# Patient Record
Sex: FEMALE | Race: WHITE | NOT HISPANIC OR LATINO | Employment: FULL TIME | URBAN - METROPOLITAN AREA
[De-identification: names, ages, dates, MRNs, and addresses within clinical notes are randomized per-mention and may not be internally consistent; named-entity substitution may affect disease eponyms.]

---

## 2017-02-13 ENCOUNTER — OFFICE VISIT - HEALTHEAST (OUTPATIENT)
Dept: FAMILY MEDICINE | Facility: CLINIC | Age: 42
End: 2017-02-13

## 2017-02-13 DIAGNOSIS — F33.9 MAJOR DEPRESSION, RECURRENT (H): ICD-10-CM

## 2017-02-13 DIAGNOSIS — F41.1 GENERALIZED ANXIETY DISORDER: ICD-10-CM

## 2017-02-13 DIAGNOSIS — R63.5 ABNORMAL WEIGHT GAIN: ICD-10-CM

## 2017-02-13 PROCEDURE — 99283 EMERGENCY DEPT VISIT LOW MDM: CPT

## 2017-02-14 ENCOUNTER — RECORDS - HEALTHEAST (OUTPATIENT)
Dept: ADMINISTRATIVE | Facility: OTHER | Age: 42
End: 2017-02-14

## 2017-02-14 ENCOUNTER — HOSPITAL ENCOUNTER (EMERGENCY)
Facility: CLINIC | Age: 42
Discharge: HOME | End: 2017-02-14
Attending: EMERGENCY MEDICINE | Admitting: EMERGENCY MEDICINE
Payer: COMMERCIAL

## 2017-02-14 VITALS
BODY MASS INDEX: 28.32 KG/M2 | DIASTOLIC BLOOD PRESSURE: 74 MMHG | TEMPERATURE: 97.9 F | RESPIRATION RATE: 18 BRPM | WEIGHT: 170 LBS | OXYGEN SATURATION: 95 % | HEIGHT: 65 IN | SYSTOLIC BLOOD PRESSURE: 127 MMHG

## 2017-02-14 DIAGNOSIS — H18.822 CONTACT LENS OVERWEAR, LEFT: ICD-10-CM

## 2017-02-14 DIAGNOSIS — H16.002 CORNEAL ULCERATION, LEFT: ICD-10-CM

## 2017-02-14 PROCEDURE — 25000125 ZZHC RX 250

## 2017-02-14 RX ORDER — PROPARACAINE HYDROCHLORIDE 5 MG/ML
1 SOLUTION/ DROPS OPHTHALMIC ONCE
Status: COMPLETED | OUTPATIENT
Start: 2017-02-14 | End: 2017-02-14

## 2017-02-14 RX ORDER — MOXIFLOXACIN 5 MG/ML
1 SOLUTION/ DROPS OPHTHALMIC
Qty: 1 BOTTLE | Refills: 0 | Status: SHIPPED | OUTPATIENT
Start: 2017-02-14 | End: 2017-02-21

## 2017-02-14 RX ORDER — PROPARACAINE HYDROCHLORIDE 5 MG/ML
SOLUTION/ DROPS OPHTHALMIC
Status: COMPLETED
Start: 2017-02-14 | End: 2017-02-14

## 2017-02-14 RX ADMIN — PROPARACAINE HYDROCHLORIDE 1 DROP: 5 SOLUTION/ DROPS OPHTHALMIC at 00:50

## 2017-02-14 ASSESSMENT — ENCOUNTER SYMPTOMS
EYE REDNESS: 1
EYE PAIN: 1

## 2017-02-14 NOTE — DISCHARGE INSTRUCTIONS
Corneal Ulcer    The cornea is the clear part in the front of the eye. A corneal ulcer is an open sore on the cornea.    The most common cause of a corneal ulcer is infection by bacteria, virus, or fungus. A scratch to the cornea that becomes infected can lead to a corneal ulcer. Use of contact lenses also increases the risk of a corneal ulcer from a bacterial infection. This can happen especially if contact lenses are not kept clean or are worn while sleeping. Any condition that causes dry eyes, such as decreased tear production or inability to blink normally, will increase the risk of a corneal ulcer. Chemical injury to the eye is another risk factor for a corneal ulcer.    A corneal ulcer may cause redness, pain, increased tears, and pus or mucus draining from the eye. Your vision may be blurry and the eyelid may swell.    Corneal ulcers are very serious. They may cause permanent scarring to the eye, with partial or complete blindness. Therefore, this condition must be recognized and treated very carefully. With proper treatment, corneal ulcers should improve in 2 to 3 weeks. Follow-up with an ophthalmologist (an eye MD) is very important.     Do not wear contact lenses until approved by your eye doctor.    Do not sleep in contact lenses.    Do not soak your contact lenses in anything other than sterile solution specifically made for contact lenses     Apply a cool compress to the eye (towel soaked in cool water).    Do not touch or rub your eye with your fingers.    Wash your hands often to prevent spread of the infection to the other eye.    You may use acetaminophen or ibuprofen to control pain, unless another pain medicine was prescribed. (Note: If you have chronic liver or kidney disease or have ever had a stomach ulcer, talk with your doctor before using these medicines.)    Use prescribed antibiotic eye drops or ointment exactly as directed.  Home care  Follow-up care  Follow up with your eye doctor in 1  to 2 days, or as advised.    For more information, see the American Academy of Ophthalmology web page on corneal ulcers: www.geteyesmart.org/eyesmart/diseases/corneal-ulcer.cfm.  When to seek medical advice  Call your healthcare provider right away if any of these occur:    Worsening vision    Increasing pain in the eye    Increased discharge from the eye    Increased redness of the eyes    Fever of 100.4 F (38 C) or higher, or as directed by your healthcare provider    4104-0625 [employer]. [address] [place], [place] [address]. All rights reserved. This information is not intended as a substitute for professional medical care. Always follow your healthcare professional's instructions.

## 2017-02-14 NOTE — ED AVS SNAPSHOT
Emergency Department    [address] [place] [address]    Phone:  [phone]    Fax:  [phone]                                       Marisel Jarrett   MRN: [id number]    Department:   Emergency Department   Date of Visit:  2/13/2017           After Visit Summary Signature Page     I have received my discharge instructions, and my questions have been answered. I have discussed any challenges I see with this plan with the nurse or doctor.    ..........................................................................................................................................  Patient/Patient Representative Signature      ..........................................................................................................................................  Patient Representative Print Name and Relationship to Patient    ..................................................               ................................................  Date                                            Time    ..........................................................................................................................................  Reviewed by Signature/Title    ...................................................              ..............................................  Date                                                            Time

## 2017-02-14 NOTE — ED AVS SNAPSHOT
Emergency Department    [address] [facility] [address]    Phone:  [phone]    Fax:  [phone]                                       Marisel Jarrett   MRN: [id number]    Department:   Emergency Department   Date of Visit:  2/13/2017           Patient Information     Date Of Birth          1975        Your diagnoses for this visit were:     Corneal ulceration, left     Contact lens overwear, left        You were seen by Trierweiler, Chad A, MD.      Follow-up Information     Follow up with [facility] In 3 days.    Contact information:    [address]  [address]  [facility] [address] [phone]        Follow up with  Emergency Department.    Specialty:  EMERGENCY MEDICINE    Why:  If symptoms worsen    Contact information:    [address] [facility] [address] [phone]        Discharge Instructions         Corneal Ulcer    The cornea is the clear part in the front of the eye. A corneal ulcer is an open sore on the cornea.    The most common cause of a corneal ulcer is infection by bacteria, virus, or fungus. A scratch to the cornea that becomes infected can lead to a corneal ulcer. Use of contact lenses also increases the risk of a corneal ulcer from a bacterial infection. This can happen especially if contact lenses are not kept clean or are worn while sleeping. Any condition that causes dry eyes, such as decreased tear production or inability to blink normally, will increase the risk of a corneal ulcer. Chemical injury to the eye is another risk factor for a corneal ulcer.    A corneal ulcer may cause redness, pain, increased tears, and pus or mucus draining from the eye. Your vision may be blurry and the eyelid may swell.    Corneal ulcers are very serious. They may cause permanent scarring to the eye, with partial or complete blindness. Therefore, this condition must be recognized and treated very carefully. With  proper treatment, corneal ulcers should improve in 2 to 3 weeks. Follow-up with an ophthalmologist (an eye MD) is very important.     Do not wear contact lenses until approved by your eye doctor.    Do not sleep in contact lenses.    Do not soak your contact lenses in anything other than sterile solution specifically made for contact lenses     Apply a cool compress to the eye (towel soaked in cool water).    Do not touch or rub your eye with your fingers.    Wash your hands often to prevent spread of the infection to the other eye.    You may use acetaminophen or ibuprofen to control pain, unless another pain medicine was prescribed. (Note: If you have chronic liver or kidney disease or have ever had a stomach ulcer, talk with your doctor before using these medicines.)    Use prescribed antibiotic eye drops or ointment exactly as directed.  Home care  Follow-up care  Follow up with your eye doctor in 1 to 2 days, or as advised.    For more information, see the American Academy of Ophthalmology web page on corneal ulcers: www.geteyesmart.org/eyesmart/diseases/corneal-ulcer.cfm.  When to seek medical advice  Call your healthcare provider right away if any of these occur:    Worsening vision    Increasing pain in the eye    Increased discharge from the eye    Increased redness of the eyes    Fever of 100.4 F (38 C) or higher, or as directed by your healthcare provider    0820-7437 [employer]. [address] [place] [address]. All rights reserved. This information is not intended as a substitute for professional medical care. Always follow your healthcare professional's instructions.          24 Hour Appointment Hotline       To make an appointment at [facility], call [phone] ([phone]). If you don't have a family doctor or clinic, we will help you find one. [facility] clinics are conveniently located to serve the needs of you and your family.             Review of your  medicines      START taking        Dose / Directions Last dose taken    moxifloxacin 0.5 % ophthalmic solution   Commonly known as:  VIGAMOX   Dose:  1 drop   Quantity:  1 Bottle        Apply 1 drop to eye every 4 hours (while awake) for 7 days   Refills:  0          Our records show that you are taking the medicines listed below. If these are incorrect, please call your family doctor or clinic.        Dose / Directions Last dose taken    BUPROPION HCL PO   Dose:  150 mg        Take 150 mg by mouth   Refills:  0        FLUOXETINE HCL PO   Dose:  40 mg        Take 40 mg by mouth   Refills:  0                Prescriptions were sent or printed at these locations (1 Prescription)                   Other Prescriptions                Printed at Department/Unit printer (1 of 1)         moxifloxacin (VIGAMOX) 0.5 % ophthalmic solution                Orders Needing Specimen Collection     None      Pending Results     No orders found from 2/12/2017 to 2/15/2017.            Pending Culture Results     No orders found from 2/12/2017 to 2/15/2017.             Test Results from your hospital stay            Clinical Quality Measure: Blood Pressure Screening     Your blood pressure was checked while you were in the emergency department today. The last reading we obtained was  BP: 127/74 . Please read the guidelines below about what these numbers mean and what you should do about them.  If your systolic blood pressure (the top number) is less than 120 and your diastolic blood pressure (the bottom number) is less than 80, then your blood pressure is normal. There is nothing more that you need to do about it.  If your systolic blood pressure (the top number) is 120-139 or your diastolic blood pressure (the bottom number) is 80-89, your blood pressure may be higher than it should be. You should have your blood pressure rechecked within a year by a primary care provider.  If your systolic blood pressure (the top number) is 140 or  "greater or your diastolic blood pressure (the bottom number) is 90 or greater, you may have high blood pressure. High blood pressure is treatable, but if left untreated over time it can put you at risk for heart attack, stroke, or kidney failure. You should have your blood pressure rechecked by a primary care provider within the next 4 weeks.  If your provider in the emergency department today gave you specific instructions to follow-up with your doctor or provider even sooner than that, you should follow that instruction and not wait for up to 4 weeks for your follow-up visit.        Thank you for choosing [facility]       Thank you for choosing [facility] for your care. Our goal is always to provide you with excellent care. Hearing back from our patients is one way we can continue to improve our services. Please take a few minutes to complete the written survey that you may receive in the mail after you visit with us. Thank you!        [employer] Information     [employer] lets you send messages to your doctor, view your test results, renew your prescriptions, schedule appointments and more. To sign up, go to [email or web address] . Click on \"Log in\" on the left side of the screen, which will take you to the Welcome page. Then click on \"Sign up Now\" on the right side of the page.     You will be asked to enter the access code listed below, as well as some personal information. Please follow the directions to create your username and password.     Your access code is: [email or web address]  Expires: 5/15/2017  1:02 AM     Your access code will  in 90 days. If you need help or a new code, please call your [facility] clinic or [phone].        Care EveryWhere ID     This is your Care EveryWhere ID. This could be used by other organizations to access your [facility] medical records  [id number]        After Visit Summary       This is your record. Keep this with you and show to your community pharmacist(s) and doctor(s) at your " next visit.

## 2017-02-14 NOTE — ED PROVIDER NOTES
"  History     Chief Complaint:  Eye Pain       HPI   Marisel Jarrett is a 41 year old female who presents with eye pain. The patient is a contact wearer and states that she slept with her contacts in last night. When she woke up she changed her contacts and noticed that her left eye was slightly red. This afternoon, she was organizing things in her garage and later this evening developed left eye discomfort. She states that she is unsure if she has any foreign body in the eye.     Allergies:  No known drug allergies.     Medications:    Bupropion  Fluoxetine     Past Medical History:    Anxiety   Depressive Disorder  HPV  OAB  Ovarian cyst     Past Surgical History:    Appendectomy   Oophorectomy, right     Family History:    History reviewed. No pertinent family history.     Social History:  Marital Status:   Presents to the ED alone  Tobacco Use: Never Used  Alcohol Use: No  PCP: NIKI HEATH      Review of Systems   Eyes: Positive for pain and redness.   All other systems reviewed and are negative.    Physical Exam   First Vitals:  BP: 127/74  Heart Rate: 84  Temp: 97.9  F (36.6  C)  Resp: 18  Height: 165.1 cm (5' 5\")  Weight: 77.1 kg (170 lb)  SpO2: 95 %    Physical Exam  Eye:  Pupils are equal, round, and reactive.  Extraocular movements intact. Left eye is red and inflamed with possible photophobia. Slit lamp exam shows no foreign body or anterior chamber cell or flare. Fluorescence shows uptake in an ulcer at 9 o clock no dendrites noted.     ENT:  No rhinorrhea.  Moist mucus membranes.  Normal tongue and tonsil.    Musculoskeletal:  Normal movement of all extremities without evidence for deficit.    Skin:  Warm and dry without rashes.    Neurologic:  Non-focal exam without asymmetric weakness or numbness.     Psychiatric:  Normal affect with appropriate interaction with examiner.     Emergency Department Course   Interventions:   Proparacaine, 1 drop, left eye    Emergency Department " Course:  Nursing notes and vitals reviewed.  I performed an exam of the patient as documented above.   Findings and plan explained to the patient. Patient discharged home with instructions regarding supportive care, medications, and reasons to return. The importance of close follow-up was reviewed. The patient was prescribed Vigamox.      Impression & Plan    Medical Decision Making:  This 41 year old contact lens wearer presents with complaints of having left sided eye pain that started after sleeping with her contacts in last night. She describes progressive soreness throughout the day, much worse tonight. On exam, she does have photophobia and redness throughout the eye. However this was completely resolved with one dose of proparacaine. Slit lamp exam shows clear evidence of a corneal ulceration at 9 o'clock without evidence of dendrites or other signs of herpes infection. She will be treated with Vigamox and advised to keep her contact lenses out for the next several days. She will follow up with the local ophthalmologist if she is not having complete resolution of her symptoms in the next 48 hours with immediate return to us for worsening of condition or other emergent concerns.     Diagnosis:    ICD-10-CM    1. Corneal ulceration, left H16.002    2. Contact lens overwear, left H18.822        Disposition:  discharged to home    Discharge Medications:  Discharge Medication List as of 2/14/2017  1:02 AM      START taking these medications    Details   moxifloxacin (VIGAMOX) 0.5 % ophthalmic solution Apply 1 drop to eye every 4 hours (while awake) for 7 days, Disp-1 Bottle, R-0, Local Print               Cindy BRYANT am serving as a scribe on 2/14/2017 at 12:45 AM to personally document services performed by Dr. Trierweiler based on my observations and the provider's statements to me.    2/13/2017    EMERGENCY DEPARTMENT       Trierweiler, Chad A, MD  02/14/17 [id number]

## 2017-03-16 ENCOUNTER — OFFICE VISIT - HEALTHEAST (OUTPATIENT)
Dept: FAMILY MEDICINE | Facility: CLINIC | Age: 42
End: 2017-03-16

## 2017-03-16 DIAGNOSIS — L71.0 PERIORAL DERMATITIS: ICD-10-CM

## 2017-03-16 DIAGNOSIS — F33.9 MAJOR DEPRESSION, RECURRENT (H): ICD-10-CM

## 2017-03-16 DIAGNOSIS — Z80.8 FAMILY HISTORY OF MELANOMA: ICD-10-CM

## 2017-03-16 DIAGNOSIS — F41.1 GENERALIZED ANXIETY DISORDER: ICD-10-CM

## 2017-08-26 ENCOUNTER — COMMUNICATION - HEALTHEAST (OUTPATIENT)
Dept: FAMILY MEDICINE | Facility: CLINIC | Age: 42
End: 2017-08-26

## 2017-08-26 DIAGNOSIS — F41.1 GENERALIZED ANXIETY DISORDER: ICD-10-CM

## 2017-08-26 DIAGNOSIS — F33.9 MAJOR DEPRESSION, RECURRENT (H): ICD-10-CM

## 2017-10-06 ENCOUNTER — OFFICE VISIT - HEALTHEAST (OUTPATIENT)
Dept: FAMILY MEDICINE | Facility: CLINIC | Age: 42
End: 2017-10-06

## 2017-10-06 DIAGNOSIS — E78.5 HYPERLIPIDEMIA: ICD-10-CM

## 2017-10-06 DIAGNOSIS — Z00.00 ENCOUNTER FOR ROUTINE HISTORY AND PHYSICAL EXAM IN FEMALE PATIENT: ICD-10-CM

## 2017-10-06 DIAGNOSIS — Z12.4 PAP SMEAR FOR CERVICAL CANCER SCREENING: ICD-10-CM

## 2017-10-06 LAB
CHOLEST SERPL-MCNC: 243 MG/DL
FASTING STATUS PATIENT QL REPORTED: NO
HDLC SERPL-MCNC: 57 MG/DL
LDLC SERPL CALC-MCNC: 150 MG/DL
TRIGL SERPL-MCNC: 179 MG/DL

## 2017-10-06 ASSESSMENT — MIFFLIN-ST. JEOR: SCORE: 1424

## 2017-10-12 LAB
HPV INTERPRETATION - HISTORICAL: ABNORMAL
HPV INTERPRETER - HISTORICAL: ABNORMAL

## 2017-10-16 LAB
BKR LAB AP ABNORMAL BLEEDING: NO
BKR LAB AP BIRTH CONTROL/HORMONES: ABNORMAL
BKR LAB AP CERVICAL APPEARANCE: NORMAL
BKR LAB AP GYN ADEQUACY: ABNORMAL
BKR LAB AP GYN INTERPRETATION: ABNORMAL
BKR LAB AP HPV REFLEX: ABNORMAL
BKR LAB AP LMP: ABNORMAL
BKR LAB AP PATIENT STATUS: ABNORMAL
BKR LAB AP PREVIOUS ABNORMAL: ABNORMAL
BKR LAB AP PREVIOUS NORMAL: ABNORMAL
HIGH RISK?: NO
PATH REPORT.COMMENTS IMP SPEC: ABNORMAL
RESULT FLAG (HE HISTORICAL CONVERSION): ABNORMAL

## 2018-01-03 ENCOUNTER — COMMUNICATION - HEALTHEAST (OUTPATIENT)
Dept: FAMILY MEDICINE | Facility: CLINIC | Age: 43
End: 2018-01-03

## 2018-01-03 DIAGNOSIS — F41.1 GENERALIZED ANXIETY DISORDER: ICD-10-CM

## 2018-01-03 DIAGNOSIS — F33.9 MAJOR DEPRESSION, RECURRENT (H): ICD-10-CM

## 2018-04-24 ENCOUNTER — COMMUNICATION - HEALTHEAST (OUTPATIENT)
Dept: FAMILY MEDICINE | Facility: CLINIC | Age: 43
End: 2018-04-24

## 2018-04-24 DIAGNOSIS — F41.1 GENERALIZED ANXIETY DISORDER: ICD-10-CM

## 2018-04-24 DIAGNOSIS — F33.9 MAJOR DEPRESSION, RECURRENT (H): ICD-10-CM

## 2018-05-23 ENCOUNTER — COMMUNICATION - HEALTHEAST (OUTPATIENT)
Dept: FAMILY MEDICINE | Facility: CLINIC | Age: 43
End: 2018-05-23

## 2018-05-23 DIAGNOSIS — F33.9 MAJOR DEPRESSION, RECURRENT (H): ICD-10-CM

## 2018-05-23 DIAGNOSIS — F41.1 GENERALIZED ANXIETY DISORDER: ICD-10-CM

## 2018-08-19 ENCOUNTER — COMMUNICATION - HEALTHEAST (OUTPATIENT)
Dept: FAMILY MEDICINE | Facility: CLINIC | Age: 43
End: 2018-08-19

## 2018-08-19 DIAGNOSIS — F33.9 MAJOR DEPRESSION, RECURRENT (H): ICD-10-CM

## 2018-08-19 DIAGNOSIS — F41.1 GENERALIZED ANXIETY DISORDER: ICD-10-CM

## 2018-09-06 ENCOUNTER — RECORDS - HEALTHEAST (OUTPATIENT)
Dept: ADMINISTRATIVE | Facility: OTHER | Age: 43
End: 2018-09-06

## 2018-10-16 ENCOUNTER — RECORDS - HEALTHEAST (OUTPATIENT)
Dept: ADMINISTRATIVE | Facility: OTHER | Age: 43
End: 2018-10-16

## 2018-12-01 ENCOUNTER — COMMUNICATION - HEALTHEAST (OUTPATIENT)
Dept: FAMILY MEDICINE | Facility: CLINIC | Age: 43
End: 2018-12-01

## 2018-12-01 DIAGNOSIS — F33.9 MAJOR DEPRESSION, RECURRENT (H): ICD-10-CM

## 2018-12-01 DIAGNOSIS — F41.1 GENERALIZED ANXIETY DISORDER: ICD-10-CM

## 2018-12-02 ENCOUNTER — COMMUNICATION - HEALTHEAST (OUTPATIENT)
Dept: FAMILY MEDICINE | Facility: CLINIC | Age: 43
End: 2018-12-02

## 2018-12-02 DIAGNOSIS — F33.9 MAJOR DEPRESSION, RECURRENT (H): ICD-10-CM

## 2018-12-02 DIAGNOSIS — F41.1 GENERALIZED ANXIETY DISORDER: ICD-10-CM

## 2019-02-27 ENCOUNTER — OFFICE VISIT - HEALTHEAST (OUTPATIENT)
Dept: FAMILY MEDICINE | Facility: CLINIC | Age: 44
End: 2019-02-27

## 2019-02-27 DIAGNOSIS — F33.9 MAJOR DEPRESSION, RECURRENT (H): ICD-10-CM

## 2019-02-27 DIAGNOSIS — E78.5 DYSLIPIDEMIA: ICD-10-CM

## 2019-02-27 DIAGNOSIS — Z00.00 ROUTINE GENERAL MEDICAL EXAMINATION AT A HEALTH CARE FACILITY: ICD-10-CM

## 2019-02-27 DIAGNOSIS — F41.1 GENERALIZED ANXIETY DISORDER: ICD-10-CM

## 2019-02-27 ASSESSMENT — MIFFLIN-ST. JEOR: SCORE: 1425.14

## 2019-04-01 ENCOUNTER — COMMUNICATION - HEALTHEAST (OUTPATIENT)
Dept: FAMILY MEDICINE | Facility: CLINIC | Age: 44
End: 2019-04-01

## 2019-10-28 ENCOUNTER — PRE VISIT (OUTPATIENT)
Dept: NEUROLOGY | Facility: CLINIC | Age: 44
End: 2019-10-28

## 2019-10-28 NOTE — TELEPHONE ENCOUNTER
FUTURE VISIT INFORMATION      FUTURE VISIT INFORMATION:    Date: 12/9/2019    Time: 10AM    Location: [facility]  REFERRAL INFORMATION:    Referring provider:  Self     Referring providers clinic:      Reason for visit/diagnosis  Headaches     RECORDS REQUESTED FROM:       Clinic name Comments Records Status Imaging Status   [facility]  Care [facility]where N/A    [facility]

## 2019-10-31 ENCOUNTER — AMBULATORY - HEALTHEAST (OUTPATIENT)
Dept: FAMILY MEDICINE | Facility: CLINIC | Age: 44
End: 2019-10-31

## 2019-12-09 ENCOUNTER — RECORDS - HEALTHEAST (OUTPATIENT)
Dept: ADMINISTRATIVE | Facility: OTHER | Age: 44
End: 2019-12-09

## 2019-12-09 ENCOUNTER — OFFICE VISIT (OUTPATIENT)
Dept: NEUROLOGY | Facility: CLINIC | Age: 44
End: 2019-12-09
Payer: COMMERCIAL

## 2019-12-09 VITALS
HEART RATE: 92 BPM | BODY MASS INDEX: 27.46 KG/M2 | DIASTOLIC BLOOD PRESSURE: 78 MMHG | SYSTOLIC BLOOD PRESSURE: 113 MMHG | OXYGEN SATURATION: 94 % | WEIGHT: 165 LBS

## 2019-12-09 DIAGNOSIS — G43.101 MIGRAINE WITH AURA AND WITH STATUS MIGRAINOSUS, NOT INTRACTABLE: Primary | ICD-10-CM

## 2019-12-09 RX ORDER — SUMATRIPTAN 50 MG/1
50-100 TABLET, FILM COATED ORAL
Qty: 12 TABLET | Refills: 6 | Status: SHIPPED | OUTPATIENT
Start: 2019-12-09 | End: 2020-06-12

## 2019-12-09 RX ORDER — ONDANSETRON 4 MG/1
4 TABLET, FILM COATED ORAL EVERY 6 HOURS PRN
Qty: 20 TABLET | Refills: 3 | Status: SHIPPED | OUTPATIENT
Start: 2019-12-09 | End: 2022-05-23

## 2019-12-09 RX ORDER — PROCHLORPERAZINE MALEATE 5 MG
2.5-5 TABLET ORAL EVERY 6 HOURS PRN
Qty: 20 TABLET | Refills: 3 | Status: SHIPPED | OUTPATIENT
Start: 2019-12-09 | End: 2022-05-23

## 2019-12-09 ASSESSMENT — PAIN SCALES - GENERAL: PAINLEVEL: NO PAIN (0)

## 2019-12-09 NOTE — NURSING NOTE
Chief Complaint   Patient presents with     Headache     [facility] NEW HEADACHE - CHRONIC HEADACHE     Shakira Sierra, EMT

## 2019-12-09 NOTE — LETTER
12/9/2019       RE: Marisel Jarrett  [address] [place] [address]     Dear Colleague,    Thank you for referring your patient, Marisel Jarrett, to the [facility]UROLOGY at [facility]. Please see a copy of my visit note below.    Re: Marisel Jarrett  MRN# [id number]  YOB: 1975  Date of Visit:12/9/2019    OUTPATIENT NEUROLOGY VISIT NOTE    Chief Complaint:  Headache evaluation    History of Present Illness  Marisel Jarrett is a 44-year-old female presents to the clinic today for headache evaluation.     Headache History:    Onset History: in late 30s and family history of headaches- sister     Current Headache Pattern:      Frequency (How many headache days per month?):about 1-2 times per month for several years     Duration of Headache: usually 24 hours     Aura: visual aura and lasting 1-2 hours and 1-2 times per month   Associated Symptoms:  nausea, vomiting, light sensitivity >sound sensitivity and prodrome feels like a hang overs       Description of Headache Pain & Location: pain feels as throbbing and starts in the back of her head and right neck and moves up to the right supraorbital/frontal area, 8-9/10 on the numeric pain scale    Do headaches interfere with or prevent usual activities or diminish your productivity at home or work?  Yes - affects family life      Treatments Tried:    Tylenol pm or advil  ondansetron tried for other reasons     Have you needed to utilize the Emergency Room to treat your headache symptoms? no    Are Headaches worsening over time?  Reports that they do because of vomiting    What makes your headaches better?  Ice pack and sleeping     What makes your headaches worse or triggers your headaches? Driving/traveling a lot and     IVF with a donating egg and in between cycles on estrogen replacement   No alcohol   No smoking     Denies history of head or neck trauma, dizziness, vertigo,  loss of consciousness, seizure, double vision, blurred vision, hearing difficulty, speech or swallowing difficulty, weakness or numbness in face, arms or legs, urinary or bowel incontinence, coordination problems or gait difficulty, fever or chills.    Neurodiagnostic Testing  CT head none available  MRI brain none available    Past Medical History reviewed and verified with the patient    Past Surgical History reviewed and verified with the patient  Past Surgical History:   Procedure Laterality Date     AK APPENDECTOMY   Description: Appendectomy; Recorded: 11/10/2012; Comments: Sept 2012     AK REMOVAL OF OVARIAN CYST(S)   Description: Ovarian Cystectomy; Recorded: 08/15/2013; Comments: Right, 2013 - benign     UTERINE FIBROID SURGERY 08/2016   at [facility]     WISDOM TeeTH EXTRACTION   at the age [age]  PE tubes at the age of 3    Family History reviewed and verified with the patient  ADHD and on bupropion and fluoxetine  History of anxiety and depression  Social History:   last summer and has 3 step kids, but no biological kids, works in Sales as a rep OR urology and has a MS in theater  Social History     Tobacco Use     Smoking status: Never Smoker     Smokeless tobacco: Never Used   Substance Use Topics     Alcohol use: No    reviewed and verified with the patient   No Known Allergies    Current Outpatient Medications   Medication Sig Dispense Refill     BUPROPION HCL PO Take 150 mg by mouth       FLUOXETINE HCL PO Take 40 mg by mouth     reviewed and verified with the patient    Review of Systems:  A 12-point ROS including constitutional, eyes, ENT, respiratory, cardiovascular, gastroenterology, genitourinary, integumentary, musculoskeletal, neurology, hematology and psychiatric were all reviewed with the patient and completed at the Neuroscience Services Question nary and as mentioned in the HPI.     General Exam:   /78   Pulse 92   Wt 74.8 kg (165 lb)   SpO2 94%   BMI 27.46 kg/m     GEN: Awake,  NAD; good eye contact, responses appropriately, healthy appearing   HEENT: Head atraumatic/Normocephalic. Scalp normal. Pupils equally round, 4 mm, reactive to light and accommodation, sclera and conjunctiva normal. Fundoscopic examination reveals normal vessels no papilledema.   Neck: Easily moveable without resistance  Heart: S1/S2 appreciated, RRR, no m/r/g, no carotid bruits  Lungs:Lungs are clear to auscultation bilaterally, no wheezes or crackles.   Neurological Examination:  The patient is alert and oriented times four. Has good attention and concentration. Speech is fluent without dysarthria.   Cranial nerves:  CN I deferred.   CN II: Intact and full visual fields to confrontation bilaterally. Funduscopic exam revealed disc bilaterally.   CN III, IV, VI: EOM intact. There is no nystagmus. Has conjugated gaze. Intact direct and consensual pupillary light reflexes.   CN V: Intact and symmetrical to facial sensation in the V1 through V3 bilaterally.   CN VII: Intact and symmetrical eyebrow and lid raise and eyelid closure, smiles and frown.   CN VIII: Intact to finger rub bilaterally.   CN IX and X: The palates elevates symmetrical. The uvula is midline.   CN XII: The tongue protrudes midline with no atrophy or fasciculations.   Motor exam: The patient has a normal bulk and tone throughout. There is no atrophy, fasciculations, clonus, or abnormal movements appreciated.   Strength Exam:  5/5 strength at shoulder abduction, elbow flexion or extension, wrist flexion or extension, finger abduction, , hip flexion and extension, knee flexion and extension, and dorsiflexion and plantarflexion bilaterally.   Sensation is intact to light touch and pinprick throughout.  Reflexes are 2+ and symmetrical at biceps, triceps, brachioradialis, patellar, and Achilles.   Coordination reveals finger-nose-finger with normal speed and accuracy.   Station and gait is normal. There is no ataxia.     Assessment and Plan:  Migraine  with aura with reported frequency 1-2 times per month for several years. Family history of migraine headaches. Non focal neurological exam today. Discussed headaches etiology and treatment options.   Plan:  Headache log for frequency and severity   Acute migraine treatment -  Stay hydrated  A trial of sumatriptan  mg at migraine headache onset and may repeat in 2 hours as needed. Max 200 mg in 24 hours. Limit use to no more than 8 days per month.May take with naproxen 1-2 tablets every 12 hours as needed and if tolerated.   Ondansetron 4 mg orally every 6 hours as needed. May try prochlorperazine as needed for nausea but it can make you drowsy or anxious.   Migraine prevention-riboflavin (B2) 400 mg daily OTC   Discuss with your OB/GYN use of sumatriptan and other medications for migraine headache if pregnant  Follow up in 3 months or sooner     Prescription for sumatriptan, ondansetron, prochlorperazine provided. Correct use and course provided. Expected benefits and typical side effects reviewed. Safety of concomitant medications and interactions reviewed. Patient taught signs and symptoms of adverse reactions and allergies. Patient understands teaching and accepts risks of prescribed medication regimen.    I discussed all my recommendation with Marisel Jarrett. The patient verbalizes understanding and comfortable with the plan. The patient has our clinic phone number to call with any questions or concerns. All of the patient's questions were answered from the best of my current knowledge.     Thank you for letting me be a part of the treatment team for Marisel Jarrett    Time spent with pt answering questions, discussing findings, counseling and coordinating care was more than 50% the appointment time, 56 minutes.     MATTI Hudson, [facility]

## 2019-12-09 NOTE — PATIENT INSTRUCTIONS
Plan:  Headache log for frequency and severity   Acute migraine treatment -  Stay hydrated  A trial of sumatriptan  mg at migraine headache onset and may repeat in 2 hours as needed. Max 200 mg in 24 hours. Limit use to no more than 8 days per month.May take with naproxen 1-2 tablets every 12 hours as needed and if tolerated.   Ondansetron 4 mg orally every 6 hours as needed. May try prochlorperazine as needed for nausea but it can make you drowsy or anxious.   Migraine prevention-riboflavin (B2) 400 mg daily OTC   Discuss with your OB/GYN use of sumatriptan and other medications for migraine headache if pregnant  Follow up in 3 months or sooner       Patient Education     Sumatriptan tablets  Brand Names: Imitrex, Migraine Pack  What is this medicine?  SUMATRIPTAN (alexandr ma TRIP tan) is used to treat migraines with or without aura. An aura is a strange feeling or visual disturbance that warns you of an attack. It is not used to prevent migraines.  How should I use this medicine?  Take this medicine by mouth with a glass of water. Follow the directions on the prescription label. This medicine is taken at the first symptoms of a migraine. It is not for everyday use. If your migraine headache returns after one dose, you can take another dose as directed. You must leave at least 2 hours between doses, and do not take more than 100 mg as a single dose. Do not take more than 200 mg total in any 24 hour period. If there is no improvement at all after the first dose, do not take a second dose without talking to your doctor or health care professional. Do not take your medicine more often than directed.  Talk to your pediatrician regarding the use of this medicine in children. Special care may be needed.  What side effects may I notice from receiving this medicine?  Side effects that you should report to your doctor or health care professional as soon as possible:    allergic reactions like skin rash, itching or hives,  swelling of the face, lips, or tongue    bloody or watery diarrhea    hallucination, loss of contact with reality    pain, tingling, numbness in the face, hands, or feet    seizures    signs and symptoms of a blood clot such as breathing problems; changes in vision; chest pain; severe, sudden headache; pain, swelling, warmth in the leg; trouble speaking; sudden numbness or weakness of the face, arm, or leg    signs and symptoms of a dangerous change in heartbeat or heart rhythm like chest pain; dizziness; fast or irregular heartbeat; palpitations, feeling faint or lightheaded; falls; breathing problems    signs and symptoms of a stroke like changes in vision; confusion; trouble speaking or understanding; severe headaches; sudden numbness or weakness of the face, arm, or leg; trouble walking; dizziness; loss of balance or coordination    stomach pain  Side effects that usually do not require medical attention (report to your doctor or health care professional if they continue or are bothersome):    changes in taste    facial flushing    headache    muscle cramps    muscle pain    nausea, vomiting    weak or tired  What may interact with this medicine?  Do not take this medicine with any of the following medicines:    cocaine    ergot alkaloids like dihydroergotamine, ergonovine, ergotamine, methylergonovine    feverfew    MAOIs like Carbex, Eldepryl, Marplan, Nardil, and Parnate    other medicines for migraine headache like almotriptan, eletriptan, frovatriptan, naratriptan, rizatriptan, zolmitriptan    tryptophan  This medicine may also interact with the following medications:    certain medicines for depression, anxiety, or psychotic disturbances  What if I miss a dose?  This does not apply; this medicine is not for regular use.  Where should I keep my medicine?  Keep out of the reach of children.  Store at room temperature between 2 and 30 degrees C (36 and 86 degrees F). Throw away any unused medicine after the  expiration date.  What should I tell my health care provider before I take this medicine?  They need to know if you have any of these conditions:    circulation problems in fingers and toes    diabetes    heart disease    high blood pressure    high cholesterol    history of irregular heartbeat    history of stroke    kidney disease    liver disease    postmenopausal or surgical removal of uterus and ovaries    seizures    smoke tobacco    stomach or intestine problems    an unusual or allergic reaction to sumatriptan, other medicines, foods, dyes, or preservatives    pregnant or trying to get pregnant    breast-feeding  What should I watch for while using this medicine?  Only take this medicine for a migraine headache. Take it if you get warning symptoms or at the start of a migraine attack. It is not for regular use to prevent migraine attacks.  You may get drowsy or dizzy. Do not drive, use machinery, or do anything that needs mental alertness until you know how this medicine affects you. To reduce dizzy or fainting spells, do not sit or stand up quickly, especially if you are an older patient. Alcohol can increase drowsiness, dizziness and flushing. Avoid alcoholic drinks.  Smoking cigarettes may increase the risk of heart-related side effects from using this medicine.  If you take migraine medicines for 10 or more days a month, your migraines may get worse. Keep a diary of headache days and medicine use. Contact your healthcare professional if your migraine attacks occur more frequently.  NOTE:This sheet is a summary. It may not cover all possible information. If you have questions about this medicine, talk to your doctor, pharmacist, or health care provider. Copyright  2019 [employer]           Patient Education     Ondansetron tablets  Brand Name: Zofran  What is this medicine?  ONDANSETRON (on DAN se dione) is used to treat nausea and vomiting caused by chemotherapy. It is also used to prevent or treat nausea  and vomiting after surgery.  How should I use this medicine?  Take this medicine by mouth with a glass of water. Follow the directions on your prescription label. Take your doses at regular intervals. Do not take your medicine more often than directed.  Talk to your pediatrician regarding the use of this medicine in children. Special care may be needed.  What side effects may I notice from receiving this medicine?  Side effects that you should report to your doctor or health care professional as soon as possible:    allergic reactions like skin rash, itching or hives, swelling of the face, lips or tongue    breathing problems    confusion    dizziness    fast or irregular heartbeat    feeling faint or lightheaded, falls    fever and chills    loss of balance or coordination    seizures    sweating    swelling of the hands or feet    tightness in the chest    tremors    unusually weak or tired  Side effects that usually do not require medical attention (report to your doctor or health care professional if they continue or are bothersome):    constipation or diarrhea    headache  What may interact with this medicine?  Do not take this medicine with any of the following medications:    apomorphine    certain medicines for fungal infections like fluconazole, itraconazole, ketoconazole, posaconazole, voriconazole    cisapride    dofetilide    dronedarone    pimozide    thioridazine    ziprasidone  This medicine may also interact with the following medications:    carbamazepine    certain medicines for depression, anxiety, or psychotic disturbances    fentanyl    linezolid    MAOIs like Carbex, Eldepryl, Marplan, Nardil, and Parnate    methylene blue (injected into a vein)    other medicines that prolong the QT interval (cause an abnormal heart rhythm)    phenytoin    rifampicin    tramadol  What if I miss a dose?  If you miss a dose, take it as soon as you can. If it is almost time for your next dose, take only that  dose. Do not take double or extra doses.  Where should I keep my medicine?  Keep out of the reach of children.  Store between 2 and 30 degrees C (36 and 86 degrees F). Throw away any unused medicine after the expiration date.  What should I tell my health care provider before I take this medicine?  They need to know if you have any of these conditions:    heart disease    history of irregular heartbeat    liver disease    low levels of magnesium or potassium in the blood    an unusual or allergic reaction to ondansetron, granisetron, other medicines, foods, dyes, or preservatives    pregnant or trying to get pregnant    breast-feeding  What should I watch for while using this medicine?  Check with your doctor or health care professional right away if you have any sign of an allergic reaction.  NOTE:This sheet is a summary. It may not cover all possible information. If you have questions about this medicine, talk to your doctor, pharmacist, or health care provider. Copyright  2019 [employer]           Patient Education     Prochlorperazine tablets  Brand Name: Compazine  What is this medicine?  PROCHLORPERAZINE (proe klor PER amaris khalil) helps to control severe nausea and vomiting. This medicine is also used to treat schizophrenia. It can also help patients who experience anxiety that is not due to psychological illness.  How should I use this medicine?  Take this medicine by mouth with a glass of water. Follow the directions on the prescription label. Take your doses at regular intervals. Do not take your medicine more often than directed. Do not stop taking this medicine suddenly. This can cause nausea, vomiting, and dizziness. Ask your doctor or health care professional for advice.  Talk to your pediatrician regarding the use of this medicine in children. Special care may be needed. While this drug may be prescribed for children as young as 2 years for selected conditions, precautions do apply.  What side effects may I  notice from receiving this medicine?  Side effects that you should report to your doctor or health care professional as soon as possible:    blurred vision    breast enlargement in men or women    breast milk in women who are not breast-feeding    chest pain, fast or irregular heartbeat    confusion, restlessness    dark yellow or brown urine    difficulty breathing or swallowing    dizziness or fainting spells    drooling, shaking, movement difficulty (shuffling walk) or rigidity    fever, chills, sore throat    involuntary or uncontrollable movements of the eyes, mouth, head, arms, and legs    seizures    stomach area pain    unusually weak or tired    unusual bleeding or bruising    yellowing of skin or eyes  Side effects that usually do not require medical attention (report to your doctor or health care professional if they continue or are bothersome):    difficulty passing urine    difficulty sleeping    headache    sexual dysfunction    skin rash, or itching  What may interact with this medicine?  Do not take this medicine with any of the following medications:    amoxapine    antidepressants like citalopram, escitalopram, fluoxetine, paroxetine, and sertraline    deferoxamine    dofetilide    maprotiline    tricyclic antidepressants like amitriptyline, clomipramine, imipramine, nortiptyline and others  This medicine may also interact with the following medications:    lithium    medicines for pain    phenytoin    propranolol    warfarin  What if I miss a dose?  If you miss a dose, take it as soon as you can. If it is almost time for your next dose, take only that dose. Do not take double or extra doses.  Where should I keep my medicine?  Keep out of the reach of children.  Store at room temperature between 15 and 30 degrees C (59 and 86 degrees F). Protect from light. Throw away any unused medicine after the expiration date.  What should I tell my health care provider before I take this medicine?  They need  to know if you have any of these conditions:    blood disorders or disease    dementia    liver disease or jaundice    Parkinson's disease    uncontrollable movement disorder    an unusual or allergic reaction to prochlorperazine, other medicines, foods, dyes, or preservatives    pregnant or trying to get pregnant    breast-feeding  What should I watch for while using this medicine?  Visit your doctor or health care professional for regular checks on your progress.  You may get drowsy or dizzy. Do not drive, use machinery, or do anything that needs mental alertness until you know how this medicine affects you. Do not stand or sit up quickly, especially if you are an older patient. This reduces the risk of dizzy or fainting spells. Alcohol may interfere with the effect of this medicine. Avoid alcoholic drinks.  This medicine can reduce the response of your body to heat or cold. Dress warm in cold weather and stay hydrated in hot weather. If possible, avoid extreme temperatures like saunas, hot tubs, very hot or cold showers, or activities that can cause dehydration such as vigorous exercise.  This medicine can make you more sensitive to the sun. Keep out of the sun. If you cannot avoid being in the sun, wear protective clothing and use sunscreen. Do not use sun lamps or tanning beds/booths.  Your mouth may get dry. Chewing sugarless gum or sucking hard candy, and drinking plenty of water may help. Contact your doctor if the problem does not go away or is severe.  NOTE:This sheet is a summary. It may not cover all possible information. If you have questions about this medicine, talk to your doctor, pharmacist, or health care provider. Copyright  2019 [employer]

## 2019-12-09 NOTE — PROGRESS NOTES
Re: Marisel Jarrett  MRN# [id number]  YOB: 1975  Date of Visit:12/9/2019    OUTPATIENT NEUROLOGY VISIT NOTE    Chief Complaint:  Headache evaluation    History of Present Illness  Marisel Jarrett is a 44-year-old female presents to the clinic today for headache evaluation.     Headache History:    Onset History: in late 30s and family history of headaches- sister     Current Headache Pattern:      Frequency (How many headache days per month?):about 1-2 times per month for several years     Duration of Headache: usually 24 hours     Aura: visual aura and lasting 1-2 hours and 1-2 times per month   Associated Symptoms:  nausea, vomiting, light sensitivity >sound sensitivity and prodrome feels like a hang overs       Description of Headache Pain & Location: pain feels as throbbing and starts in the back of her head and right neck and moves up to the right supraorbital/frontal area, 8-9/10 on the numeric pain scale    Do headaches interfere with or prevent usual activities or diminish your productivity at home or work?  Yes - affects family life      Treatments Tried:    Tylenol pm or advil  ondansetron tried for other reasons     Have you needed to utilize the Emergency Room to treat your headache symptoms? no    Are Headaches worsening over time?  Reports that they do because of vomiting    What makes your headaches better?  Ice pack and sleeping     What makes your headaches worse or triggers your headaches? Driving/traveling a lot and     IVF with a donating egg and in between cycles on estrogen replacement   No alcohol   No smoking     Denies history of head or neck trauma, dizziness, vertigo, loss of consciousness, seizure, double vision, blurred vision, hearing difficulty, speech or swallowing difficulty, weakness or numbness in face, arms or legs, urinary or bowel incontinence, coordination problems or gait difficulty, fever or chills.    Neurodiagnostic Testing  CT head none  available  MRI brain none available    Past Medical History reviewed and verified with the patient    Past Surgical History reviewed and verified with the patient  Past Surgical History:   Procedure Laterality Date     LA APPENDECTOMY   Description: Appendectomy; Recorded: 11/10/2012; Comments: Sept 2012     LA REMOVAL OF OVARIAN CYST(S)   Description: Ovarian Cystectomy; Recorded: 08/15/2013; Comments: Right, 2013 - benign     UTERINE FIBROID SURGERY 08/2016   at [facility]     WISDOM TeeTH EXTRACTION  at the age [age]  PE tubes at the age of 3    Family History reviewed and verified with the patient  ADHD and on bupropion and fluoxetine  History of anxiety and depression  Social History:   last summer and has 3 step kids, but no biological kids, works in Sales as a rep OR urology and has a MS in theater  Social History     Tobacco Use     Smoking status: Never Smoker     Smokeless tobacco: Never Used   Substance Use Topics     Alcohol use: No    reviewed and verified with the patient   No Known Allergies    Current Outpatient Medications   Medication Sig Dispense Refill     BUPROPION HCL PO Take 150 mg by mouth       FLUOXETINE HCL PO Take 40 mg by mouth     reviewed and verified with the patient    Review of Systems:  A 12-point ROS including constitutional, eyes, ENT, respiratory, cardiovascular, gastroenterology, genitourinary, integumentary, musculoskeletal, neurology, hematology and psychiatric were all reviewed with the patient and completed at the [facility] and as mentioned in the HPI.     General Exam:   /78   Pulse 92   Wt 74.8 kg (165 lb)   SpO2 94%   BMI 27.46 kg/m    GEN: Awake, NAD; good eye contact, responses appropriately, healthy appearing   HEENT: Head atraumatic/Normocephalic. Scalp normal. Pupils equally round, 4 mm, reactive to light and accommodation, sclera and conjunctiva normal. Fundoscopic examination reveals normal vessels no papilledema.   Neck: Easily  moveable without resistance  Heart: S1/S2 appreciated, RRR, no m/r/g, no carotid bruits  Lungs:Lungs are clear to auscultation bilaterally, no wheezes or crackles.   Neurological Examination:  The patient is alert and oriented times four. Has good attention and concentration. Speech is fluent without dysarthria.   Cranial nerves:  CN I deferred.   CN II: Intact and full visual fields to confrontation bilaterally. Funduscopic exam revealed disc bilaterally.   CN III, IV, VI: EOM intact. There is no nystagmus. Has conjugated gaze. Intact direct and consensual pupillary light reflexes.   CN V: Intact and symmetrical to facial sensation in the V1 through V3 bilaterally.   CN VII: Intact and symmetrical eyebrow and lid raise and eyelid closure, smiles and frown.   CN VIII: Intact to finger rub bilaterally.   CN IX and X: The palates elevates symmetrical. The uvula is midline.   CN XII: The tongue protrudes midline with no atrophy or fasciculations.   Motor exam: The patient has a normal bulk and tone throughout. There is no atrophy, fasciculations, clonus, or abnormal movements appreciated.   Strength Exam:  5/5 strength at shoulder abduction, elbow flexion or extension, wrist flexion or extension, finger abduction, , hip flexion and extension, knee flexion and extension, and dorsiflexion and plantarflexion bilaterally.   Sensation is intact to light touch and pinprick throughout.  Reflexes are 2+ and symmetrical at biceps, triceps, brachioradialis, patellar, and Achilles.   Coordination reveals finger-nose-finger with normal speed and accuracy.   Station and gait is normal. There is no ataxia.     Assessment and Plan:  Migraine with aura with reported frequency 1-2 times per month for several years. Family history of migraine headaches. Non focal neurological exam today. Discussed headaches etiology and treatment options.   Plan:  Headache log for frequency and severity   Acute migraine treatment -  Stay hydrated  A  trial of sumatriptan  mg at migraine headache onset and may repeat in 2 hours as needed. Max 200 mg in 24 hours. Limit use to no more than 8 days per month.May take with naproxen 1-2 tablets every 12 hours as needed and if tolerated.   Ondansetron 4 mg orally every 6 hours as needed. May try prochlorperazine as needed for nausea but it can make you drowsy or anxious.   Migraine prevention-riboflavin (B2) 400 mg daily OTC   Discuss with your OB/GYN use of sumatriptan and other medications for migraine headache if pregnant  Follow up in 3 months or sooner       Prescription for sumatriptan, ondansetron, prochlorperazine provided. Correct use and course provided. Expected benefits and typical side effects reviewed. Safety of concomitant medications and interactions reviewed. Patient taught signs and symptoms of adverse reactions and allergies. Patient understands teaching and accepts risks of prescribed medication regimen.        I discussed all my recommendation with Marisel Jarrett. The patient verbalizes understanding and comfortable with the plan. The patient has our clinic phone number to call with any questions or concerns. All of the patient's questions were answered from the best of my current knowledge.     Thank you for letting me be a part of the treatment team for Marisel Jarrett    Time spent with pt answering questions, discussing findings, counseling and coordinating care was more than 50% the appointment time, 56 minutes.         MATTI Hudson, [facility]

## 2019-12-12 ENCOUNTER — OFFICE VISIT - HEALTHEAST (OUTPATIENT)
Dept: FAMILY MEDICINE | Facility: CLINIC | Age: 44
End: 2019-12-12

## 2019-12-12 DIAGNOSIS — L70.0 CYSTIC ACNE: ICD-10-CM

## 2019-12-12 DIAGNOSIS — F41.1 GENERALIZED ANXIETY DISORDER: ICD-10-CM

## 2019-12-12 DIAGNOSIS — L81.1 MELASMA: ICD-10-CM

## 2019-12-12 DIAGNOSIS — F33.41 RECURRENT MAJOR DEPRESSIVE DISORDER, IN PARTIAL REMISSION (H): ICD-10-CM

## 2019-12-12 DIAGNOSIS — Z12.31 VISIT FOR SCREENING MAMMOGRAM: ICD-10-CM

## 2019-12-12 ASSESSMENT — PATIENT HEALTH QUESTIONNAIRE - PHQ9: SUM OF ALL RESPONSES TO PHQ QUESTIONS 1-9: 9

## 2019-12-31 ENCOUNTER — HOSPITAL ENCOUNTER (OUTPATIENT)
Dept: MAMMOGRAPHY | Facility: CLINIC | Age: 44
Discharge: HOME | End: 2019-12-31
Attending: FAMILY MEDICINE

## 2019-12-31 ENCOUNTER — RECORDS - HEALTHEAST (OUTPATIENT)
Dept: RADIOLOGY | Facility: CLINIC | Age: 44
End: 2019-12-31

## 2019-12-31 DIAGNOSIS — Z12.31 VISIT FOR SCREENING MAMMOGRAM: ICD-10-CM

## 2020-01-04 ENCOUNTER — COMMUNICATION - HEALTHEAST (OUTPATIENT)
Dept: FAMILY MEDICINE | Facility: CLINIC | Age: 45
End: 2020-01-04

## 2020-01-04 DIAGNOSIS — F41.1 GENERALIZED ANXIETY DISORDER: ICD-10-CM

## 2020-03-08 ENCOUNTER — COMMUNICATION - HEALTHEAST (OUTPATIENT)
Dept: FAMILY MEDICINE | Facility: CLINIC | Age: 45
End: 2020-03-08

## 2020-03-08 DIAGNOSIS — F41.1 GENERALIZED ANXIETY DISORDER: ICD-10-CM

## 2020-03-08 DIAGNOSIS — F33.9 MAJOR DEPRESSION, RECURRENT (H): ICD-10-CM

## 2020-03-09 ENCOUNTER — COMMUNICATION - HEALTHEAST (OUTPATIENT)
Dept: FAMILY MEDICINE | Facility: CLINIC | Age: 45
End: 2020-03-09

## 2020-03-09 DIAGNOSIS — F33.41 RECURRENT MAJOR DEPRESSIVE DISORDER, IN PARTIAL REMISSION (H): ICD-10-CM

## 2020-03-26 ENCOUNTER — VIRTUAL VISIT (OUTPATIENT)
Dept: FAMILY MEDICINE | Facility: OTHER | Age: 45
End: 2020-03-26

## 2020-03-26 NOTE — PROGRESS NOTES
"Date: 2020 10:19:52  Clinician: Adrienne Yousif  Clinician NPI: [id number]  Patient: Marisel Burgos  Patient : 1975  Patient Address: [address] [place] [address]  Patient Phone: [phone]  Visit Protocol: URI  Patient Summary:  Marisel is a 44 year old ( : 1975 ) female who initiated a Visit for COVID-19 (Coronavirus) evaluation and screening. When asked the question \"Please sign me up to receive news, health information and promotions from [employer].\", Marisel responded \"No\".    Marisel states her symptoms started 1-2 days ago.   Her symptoms consist of rhinitis, a sore throat, a cough, nasal congestion, malaise, enlarged lymph nodes, tooth pain, a headache, facial pain or pressure, myalgia, and chills. She is experiencing mild difficulty breathing with activities but can speak normally in full sentences.   Symptom details     Nasal secretions: The color of her mucus is yellow and white.    Cough: Marisel coughs a few times an hour and her cough is more bothersome at night. Phlegm does not come into her throat when she coughs. She does not believe her cough is caused by post-nasal drip.     Sore throat: Marisel reports having moderate throat pain (4-6 on a 10 point pain scale), does not have exudate on her tonsils, and can swallow liquids. The lymph nodes in her neck are enlarged. A rash has not appeared on the skin since the sore throat started.     Facial pain or pressure: The facial pain or pressure feels worse when bending over or leaning forward.     Headache: She states the headache is mild (1-3 on a 10 point pain scale).     Tooth pain: The tooth pain is not caused by a cavity, recent dental work, or other mouth problems.      Marisel denies having ear pain, wheezing, and fever. She also denies taking antibiotic medication for the symptoms and having recent facial or sinus surgery in the past 60 days.   Precipitating events  Within the past week, Marisel has not been exposed to " someone with strep throat. She has not recently been exposed to someone with influenza. Marisel has not been in close contact with any high risk individuals.   Pertinent COVID-19 (Coronavirus) information  Marisel has not traveled internationally or to the areas where COVID-19 (Coronavirus) is widespread, including cruise ship travel in the last 14 days before the start of her symptoms.   Marisel has not had a close contact with a laboratory-confirmed COVID-19 patient within 14 days of symptom onset. She also has not had a close contact with a suspected COVID-19 patient within 14 days of symptom onset.   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 She does not provide direct patient care. She lives with a healthcare worker.   Pertinent medical history  Marisel had 1 sinus infection within the past year.   Marisel typically gets a yeast infection when she takes antibiotics. She has used fluconazole (Diflucan) to treat previous yeast infections. 1 dose of fluconazole (Diflucan) has typically been sufficient for symptoms to resolve in the past.   Marisel needs a return to work/school note.   Weight: 160 lbs   Marisel does not smoke or use smokeless tobacco.   She denies pregnancy and denies breastfeeding. She does not menstruate.   Weight: 160 lbs    MEDICATIONS: fluoxetine oral, bupropion HCl oral, ALLERGIES: NKDA  Clinician Response:  Dear Marisel,   Based on the information you have provided, you do have symptoms that are consistent with Coronavirus (COVID-19).  The coronavirus causes mild to severe respiratory illness with the most common symptoms including fever, cough and difficulty breathing. Unfortunately, many viruses cause similar symptoms and it can be difficult to distinguish between viruses, especially in mild cases, so we are presuming that anyone with cough or fever has coronavirus at this time.  Coronavirus/COVID-19 has reached the point of community  spread in [place], meaning that we are finding the virus in people with no known exposure risk for юлия the virus. Given the increasing commonness of coronavirus in the community we are no longer testing patients who are not critically ill.  If you are a health care worker, you should refer to your employee health office for instructions about testing and returning to work.  For everyone else who has cough or fever, you should assume you are infected with coronavirus. Since you will not be tested but have symptoms that may be consistent with coronavirus, the CDC recommends you stay in self-isolation until these three things have happened:    You have had no fever for at least 72 hours (that is three full days of no fever without the use of medicine that reduces fevers)    AND   Other symptoms have improved (for example, when your cough or shortness of breath have improved)   AND   At least 7 days have passed since your symptoms first appeared.   How to Isolate:   Isolate yourself at home.  Do Not allow any visitors  Do Not go to work or school  Do Not go to Jew,  centers, shopping, or other public places.  Do Not shake hands.  Avoid close contact with others (hugging, kissing).   Protect Others:   Cover Your Mouth and Nose with a mask, disposable tissue or wash cloth to avoid spreading germs to others.  Wash your hands and face frequently with soap and water.   We know it can be scary to hear that you might have COVID-19. Our team can help track your symptoms and make sure you are doing ok over the next two weeks using a program called [employer] to keep in touch. When you receive an email from [employer], please consider enrolling in our monitoring program. There is no cost to you for monitoring. Here is a URL where you can learn more: [email or web address]  Managing Symptoms:   At this time, we primarily recommend Tylenol (Acetaminophen) for fever or pain. If you have liver or  kidney problems, contact your primary care provider for instructions on use of tylenol. Adults can take 650 mg (two 325 mg pills) by mouth every 4-6 hours as needed OR 1,000 mg (two 500 mg pills) every 8 hours as needed. MAXIMUM DAILY DOSE: 3,000mg. For children, refer to dosing on bottle based on age or weight.   If you develop significant shortness of breath that prevents you from doing normal activities, please call 911 or proceed to the nearest emergency room and alert them immediately that you have been in self-isolation for possible coronavirus.  For more information about COVID19 and options for caring for yourself at home, please visit the CDC website at https://www.cdc.gov/coronavirus/2019-ncov/about/steps-when-sick.htmlFor more options for care at [facility], please visit our website at [email or web address]    Diagnosis: Cough  Diagnosis ICD: R05

## 2020-06-12 DIAGNOSIS — G43.101 MIGRAINE WITH AURA AND WITH STATUS MIGRAINOSUS, NOT INTRACTABLE: ICD-10-CM

## 2020-06-12 RX ORDER — SUMATRIPTAN 50 MG/1
50-100 TABLET, FILM COATED ORAL
Qty: 12 TABLET | Refills: 6 | Status: SHIPPED | OUTPATIENT
Start: 2020-06-12 | End: 2022-01-11

## 2020-06-12 NOTE — TELEPHONE ENCOUNTER
[facility] Call Center    Phone Message    May a detailed message be left on voicemail: yes     Reason for Call: Medication Refill Request    Has the patient contacted the pharmacy for the refill? Yes   Name of medication being requested: SUMAtriptan (IMITREX) 50 MG tablet  Provider who prescribed the medication: Isabell Porras CNP  Pharmacy: [id number] - [place], [place] - [address]  Date medication is needed: ASAP - Patient has a migraine and is completely out of this medication today. Requesting this to be refilled ASAP.         Action Taken: Message routed to:  [facility] ([facility] neuro    Travel Screening: Negative

## 2020-06-12 NOTE — TELEPHONE ENCOUNTER
Rx Authorization:    Requested Medication/ Dose: Imitrex 50MG tab    Date last refill ordered: 12/9/19    Quantity ordered: 12 tabs    # refills: 6    Date of last clinic visit with ordering provider: 12/9/19    Date of next clinic visit with ordering provider:f/u 1 year     All pertinent protocol data (lab date/result):     Include pertinent information from patients message:

## 2020-10-05 DIAGNOSIS — Z11.59 SCREENING FOR VIRAL DISEASE: ICD-10-CM

## 2020-10-10 DIAGNOSIS — Z11.59 SCREENING FOR VIRAL DISEASE: ICD-10-CM

## 2020-10-10 PROCEDURE — 86769 SARS-COV-2 COVID-19 ANTIBODY: CPT | Performed by: FAMILY MEDICINE

## 2020-10-10 PROCEDURE — 36415 COLL VENOUS BLD VENIPUNCTURE: CPT | Performed by: FAMILY MEDICINE

## 2020-10-12 LAB
COVID-19 ANTIBODY IGG: NEGATIVE
LAB TEST METHOD: NORMAL

## 2020-12-27 ENCOUNTER — HEALTH MAINTENANCE LETTER (OUTPATIENT)
Age: 45
End: 2020-12-27

## 2021-01-07 ENCOUNTER — OFFICE VISIT - HEALTHEAST (OUTPATIENT)
Dept: FAMILY MEDICINE | Facility: CLINIC | Age: 46
End: 2021-01-07

## 2021-01-07 DIAGNOSIS — Z33.1 PREGNANT STATE, INCIDENTAL: ICD-10-CM

## 2021-01-07 DIAGNOSIS — F33.41 RECURRENT MAJOR DEPRESSIVE DISORDER, IN PARTIAL REMISSION (H): ICD-10-CM

## 2021-01-07 ASSESSMENT — MIFFLIN-ST. JEOR: SCORE: 1441.57

## 2021-01-22 ENCOUNTER — AMBULATORY - HEALTHEAST (OUTPATIENT)
Dept: LAB | Facility: CLINIC | Age: 46
End: 2021-01-22

## 2021-01-22 ENCOUNTER — AMBULATORY - HEALTHEAST (OUTPATIENT)
Dept: SURGERY | Facility: AMBULATORY SURGERY CENTER | Age: 46
End: 2021-01-22

## 2021-01-22 DIAGNOSIS — Z11.59 ENCOUNTER FOR SCREENING FOR OTHER VIRAL DISEASES: ICD-10-CM

## 2021-01-23 LAB
SARS-COV-2 PCR COMMENT: NORMAL
SARS-COV-2 RNA SPEC QL NAA+PROBE: NEGATIVE
SARS-COV-2 VIRUS SPECIMEN SOURCE: NORMAL

## 2021-01-24 ENCOUNTER — COMMUNICATION - HEALTHEAST (OUTPATIENT)
Dept: SCHEDULING | Facility: CLINIC | Age: 46
End: 2021-01-24

## 2021-01-25 ENCOUNTER — ANESTHESIA - HEALTHEAST (OUTPATIENT)
Dept: SURGERY | Facility: AMBULATORY SURGERY CENTER | Age: 46
End: 2021-01-25

## 2021-01-25 ENCOUNTER — SURGERY - HEALTHEAST (OUTPATIENT)
Dept: SURGERY | Facility: AMBULATORY SURGERY CENTER | Age: 46
End: 2021-01-25

## 2021-01-25 ASSESSMENT — MIFFLIN-ST. JEOR: SCORE: 1441.57

## 2021-03-06 ENCOUNTER — HEALTH MAINTENANCE LETTER (OUTPATIENT)
Age: 46
End: 2021-03-06

## 2021-04-28 ENCOUNTER — COMMUNICATION - HEALTHEAST (OUTPATIENT)
Dept: FAMILY MEDICINE | Facility: CLINIC | Age: 46
End: 2021-04-28

## 2021-04-28 DIAGNOSIS — F33.41 RECURRENT MAJOR DEPRESSIVE DISORDER, IN PARTIAL REMISSION (H): ICD-10-CM

## 2021-05-26 ASSESSMENT — PATIENT HEALTH QUESTIONNAIRE - PHQ9: SUM OF ALL RESPONSES TO PHQ QUESTIONS 1-9: 9

## 2021-05-27 NOTE — TELEPHONE ENCOUNTER
Who is calling:  Patient  Reason for Call:  Patient states she had a positive Hep B lab test at a fertility clinic and she is wondering why she could be positive. Patient is requesting a phone call back from Dr. Palmer to discuss futher.    Date of last appointment with primary care: 3/16/17  Okay to leave a detailed message: No

## 2021-05-27 NOTE — TELEPHONE ENCOUNTER
"Called patient.  She was read her results on the phone by a nurse that her \"Hepatitis B test was positive\".  She does not have the actual results.  They told her that she would have to meet with infectious disease to move forward.    Wondering where she could have gotten it.  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  Discussed that I would wait for further bloodwork first as this could be: 1) incorrect interpretation, 2) Old exposure 3) chronic carrier.  Unlikely to be acute as she is not symptomatic.  She has an upcoming appointment on 4/18/2019.  She felt reassured understanding that further data needs to be obtained and there are other diagnoses beyond acute hepatitis B.    "

## 2021-05-30 VITALS — BODY MASS INDEX: 29.37 KG/M2 | WEIGHT: 176.5 LBS

## 2021-05-31 VITALS — HEIGHT: 65 IN | BODY MASS INDEX: 28.95 KG/M2 | WEIGHT: 173.75 LBS

## 2021-06-02 VITALS — HEIGHT: 65 IN | WEIGHT: 174 LBS | BODY MASS INDEX: 28.99 KG/M2

## 2021-06-03 VITALS
BODY MASS INDEX: 27.46 KG/M2 | DIASTOLIC BLOOD PRESSURE: 80 MMHG | OXYGEN SATURATION: 98 % | HEIGHT: 65 IN | HEART RATE: 82 BPM | SYSTOLIC BLOOD PRESSURE: 110 MMHG

## 2021-06-04 ENCOUNTER — COMMUNICATION - HEALTHEAST (OUTPATIENT)
Dept: FAMILY MEDICINE | Facility: CLINIC | Age: 46
End: 2021-06-04

## 2021-06-04 DIAGNOSIS — F33.9 MAJOR DEPRESSION, RECURRENT (H): ICD-10-CM

## 2021-06-04 DIAGNOSIS — F41.1 GENERALIZED ANXIETY DISORDER: ICD-10-CM

## 2021-06-04 NOTE — TELEPHONE ENCOUNTER
Refill Approved    Rx renewed per Medication Renewal Policy. Medication was last renewed on 12/12/2019.    Xavi Louis, [facility] Triage/Med Refill 1/5/2020     Requested Prescriptions   Pending Prescriptions Disp Refills     hydrOXYzine pamoate (VISTARIL) 25 MG capsule [Pharmacy Med Name: HYDROXYZINE AILEEN 25 MG CAP] 30 capsule 0     Sig: TAKE 1 CAPSULE (25 MG TOTAL) BY MOUTH AT BEDTIME AS NEEDED (INSOMNIA).       Antihistamine Refill Protocol Passed - 1/4/2020 10:37 AM        Passed - Patient has had office visit/physical in last year     Last office visit with prescriber/PCP: 12/12/2019 Bebe Palmer MD OR same dept: 12/12/2019 Bebe Palmer MD OR same specialty: 12/12/2019 Bebe Palmer MD  Last physical: 2/27/2019 Last [facility] visit: Visit date not found   Next visit within 3 mo: Visit date not found  Next physical within 3 mo: Visit date not found  Prescriber OR PCP: Bebe Palmer MD  Last diagnosis associated with med order: 1. Generalized anxiety disorder  - hydrOXYzine pamoate (VISTARIL) 25 MG capsule [Pharmacy Med Name: HYDROXYZINE AILEEN 25 MG CAP]; Take 1 capsule (25 mg total) by mouth at bedtime as needed (insomnia).  Dispense: 30 capsule; Refill: 0    If protocol passes may refill for 12 months if within 3 months of last provider visit (or a total of 15 months).

## 2021-06-04 NOTE — PROGRESS NOTES
"HPI:    Patient is a 45 yo female who is here for a med check for her depression.    She notes that she has been noticing that she has been more irritable and snappy.  She and her  were doing IVF where they used an egg back.  They have been unsuccessful thus far.  They are looking into a live donor with a possible egg transfer occurring on January 20th.    Her home life has been difficult.  The mother of her stepchildren has been declining an independent child evaluator.  She will not give up parental rights, but will not participate in reunification therapy.  They have sole custody.  Once she showed up on their doorstep banging on the door with a snowshovel.  The step kids have a lot of emotional fallout from their childhood.  Eating is a difficulty for all of them.  Oldest 15 yo was at the [employer] and continues to have difficulty with emotional eating.  Brother has a sensory disorder that causes him to scream often.  All of them have anxiety.  They have a good therapist.    Recently, her  had a severe gastroenteritis \"which took him out\" for 3 weeks.      Sleep: taking tylenol pm.  Having crazy dreams.  Waking frequently and trouble falling asleep.    She is wondering if we should increase her medication as she feels more frayed.  She notes that her  makes it a point to have weekly date nights that include a massage, dinner.    Patient Active Problem List    Diagnosis Date Noted     Menorrhagia      Gynecologic Services Intrauterine Device (IUD) Insertion      Vaginal Discharge      Pain During Urination (Dysuria)      Fatigue      Generalized Anxiety Disorder      Cyst On The Right Ovary      Abdominal Pain Above The Pubic Area (Suprapubic)        Current Outpatient Medications:      FLUoxetine (PROZAC) 40 MG capsule, TAKE 1 CAPSULE BY MOUTH EVERY DAY, Disp: 90 capsule, Rfl: 3     APRI 0.15-0.03 mg per tablet, , Disp: , Rfl: 1     buPROPion (WELLBUTRIN XL) 150 MG 24 hr tablet, Take 1 " "tablet (300 mg total) by mouth daily., Disp: 90 tablet, Rfl: 0     hydroquinone 4 % cream, , Disp: , Rfl: 0     hydrOXYzine pamoate (VISTARIL) 25 MG capsule, Take 1 capsule (25 mg total) by mouth at bedtime as needed (insomnia)., Disp: 30 capsule, Rfl: 0     minocycline (MINOCIN,DYNACIN) 100 MG capsule, Take 100 mg by mouth 2 (two) times a day., Disp: , Rfl:       Objective     /80 (Patient Site: Right Arm, Patient Position: Sitting, Cuff Size: Adult Regular)   Pulse 82   Ht 5' 5\" (1.651 m)   LMP 02/11/2017   SpO2 98%   BMI 28.96 kg/m    General appearance: alert, appears stated age and cooperative   Psych Exam:  Behavior:normal    Mood:depressed   Affect:normal   Eye Contact:normal   Thought Content: normal   Insight:normal    Judgement: normal   Little interest or pleasure in doing things: Not at all  Feeling down, depressed, or hopeless: Several days  Trouble falling or staying asleep, or sleeping too much: More than half the days  Feeling tired or having little energy: Several days  Poor appetite or overeating: More than half the days  Feeling bad about yourself - or that you are a failure or have let yourself or your family down: More than half the days  Trouble concentrating on things, such as reading the newspaper or watching television: Several days  Moving or speaking so slowly that other people could have noticed. Or the opposite - being so fidgety or restless that you have been moving around a lot more than usual: Not at all  Thoughts that you would be better off dead, or of hurting yourself in some way: Not at all  PHQ-9 Total Score: 9  If you checked off any problems, how difficult have these problems made it for you to do your work, take care of things at home, or get along with other people?: (DID NOT ANSWER)    Marisel was seen today for medication check.    Diagnoses and all orders for this visit:    Recurrent major depressive disorder, in partial remission (H)  -     buPROPion " (WELLBUTRIN XL) 300 MG 24 hr tablet; Take 1 tablet (300 mg total) by mouth daily.  Patient would like to increase her bupropion.  She had been on 300 mg in the past and she is noting lower energy and emotional reserve.  Will increase it, but I did discuss that this may result in more disturbed sleep.  She will continue on the fluoxetine 40 mg dose.  Recommend follow up in 4-6 weeks, sooner if having difficulty.    Generalized anxiety disorder  -     hydrOXYzine pamoate (VISTARIL) 25 MG capsule; Take 1 capsule (25 mg total) by mouth at bedtime as needed (insomnia).  Patient is having more disturbed sleep related to stress.  She will hopefully be getting and egg transfer in January.    Will provide vistaril to help with sleep over the benadryl.    Melasma  Recommend stopping the hydroxyquinone for pregnancy.    Cystic acne  Recommend stopping the triamcinolone for pregnancy.    Visit for screening mammogram  -     Mammo Screening Bilateral; Future

## 2021-06-05 VITALS
SYSTOLIC BLOOD PRESSURE: 110 MMHG | WEIGHT: 175 LBS | DIASTOLIC BLOOD PRESSURE: 62 MMHG | HEART RATE: 84 BPM | BODY MASS INDEX: 28.12 KG/M2 | OXYGEN SATURATION: 96 % | HEIGHT: 66 IN

## 2021-06-05 VITALS — BODY MASS INDEX: 28.12 KG/M2 | WEIGHT: 175 LBS | HEIGHT: 66 IN

## 2021-06-06 NOTE — TELEPHONE ENCOUNTER
Refill Approved    Rx renewed per Medication Renewal Policy. Medication was last renewed on 2/27/19.    Shanel Casillas, [facility] Triage/Med Refill 3/12/2020     Requested Prescriptions   Pending Prescriptions Disp Refills     FLUoxetine (PROZAC) 40 MG capsule [Pharmacy Med Name: FLUOXETINE HCL 40 MG CAPSULE] 90 capsule 3     Sig: TAKE 1 CAPSULE BY MOUTH EVERY DAY       [facility] Refill Protocol  Passed - 3/8/2020  9:28 AM        Passed - PCP or prescribing provider visit in last year     Last office visit with prescriber/PCP: 12/12/2019 Bebe Palmer MD OR same dept: 12/12/2019 Bebe Palmer MD OR same specialty: 12/12/2019 Bebe Palmer MD  Last physical: 2/27/2019 Last MTM visit: Visit date not found   Next visit within 3 mo: Visit date not found  Next physical within 3 mo: Visit date not found  Prescriber OR PCP: Bebe Palmer MD  Last diagnosis associated with med order: 1. Generalized anxiety disorder  - FLUoxetine (PROZAC) 40 MG capsule [Pharmacy Med Name: FLUOXETINE HCL 40 MG CAPSULE]; TAKE 1 CAPSULE BY MOUTH EVERY DAY  Dispense: 90 capsule; Refill: 3    2. Major depression, recurrent (H)  - FLUoxetine (PROZAC) 40 MG capsule [Pharmacy Med Name: FLUOXETINE HCL 40 MG CAPSULE]; TAKE 1 CAPSULE BY MOUTH EVERY DAY  Dispense: 90 capsule; Refill: 3    If protocol passes may refill for 12 months if within 3 months of last provider visit (or a total of 15 months).

## 2021-06-06 NOTE — TELEPHONE ENCOUNTER
Refill Approved    Rx renewed per Medication Renewal Policy. Medication was last renewed on 12/12/19.    Shanel Casillas, [facility] Triage/Med Refill 3/12/2020     Requested Prescriptions   Pending Prescriptions Disp Refills     buPROPion (WELLBUTRIN XL) 300 MG 24 hr tablet [Pharmacy Med Name: BUPROPION HCL  MG TABLET] 90 tablet 0     Sig: TAKE 1 TABLET BY MOUTH EVERY DAY       Tricyclics/Misc Antidepressant/Antianxiety Meds Refill Protocol Passed - 3/9/2020  2:11 AM        Passed - PCP or prescribing provider visit in last year     Last office visit with prescriber/PCP: 12/12/2019 Bebe Palmer MD OR same dept: 12/12/2019 Bebe Palmer MD OR same specialty: 12/12/2019 Bebe Palmer MD  Last physical: 2/27/2019 Last MTM visit: Visit date not found   Next visit within 3 mo: Visit date not found  Next physical within 3 mo: Visit date not found  Prescriber OR PCP: Bebe Palmer MD  Last diagnosis associated with med order: 1. Recurrent major depressive disorder, in partial remission (H)  - buPROPion (WELLBUTRIN XL) 300 MG 24 hr tablet [Pharmacy Med Name: BUPROPION HCL  MG TABLET]; TAKE 1 TABLET BY MOUTH EVERY DAY  Dispense: 90 tablet; Refill: 0    If protocol passes may refill for 12 months if within 3 months of last provider visit (or a total of 15 months).

## 2021-06-08 NOTE — PROGRESS NOTES
"Patient presents today for review of her neuropsyche evaluation and discuss menopause.    1) Anxiety and Depression:  Patient has long standing history with anxiety and depression, but has had longstanding issues with memory.  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    Patient had a neuropsych evaluation.  Unfortunately she felt it focused too much on the potential that she may have bipolar disorder since it runs in her family (sister and mother).  Patient has never had a manic episode, but found it to be the focus of the evaluation once she had mentioned the family history.  She has no desire to be on stimulant medication such as ritalin due to her history of polysubstance abuse, but would really like to try strattera.  Discussed that without a diagnosis of ADD, it would be unlikely that insurance would approve coverage for the medication.      With regards to her depression:  She had been on venlafaxine and pristiq in the past, but it made her more irritable and her anxiety worse.  I had started her on fluoxetine and since increasing the dose to 40 mg, she feels that the Fluoxetine manages depression to the point where she can deal with the anxiety and depression.  Her focus is the worst however.  Discussed the possibility of adding Wellbutrin to help with focus.  Patient had last tried Wellbutrin in [place] when she was trying to quit smoking and it caused panic attacks.  But that was before she was diagnosed and went into rehab.  She would like to try it in addition to her fluoxetine.    2) Menopausal symptoms:  Patient had a fertility workup and was told that essentially from their standpoint, she was in menopause.  She notes her basal temperature has gone up where she feels fine with just a sweater in below zero weather.  Occasionally she will feel \"super hot " "and irritated.\"  She got her menses in early December, but then had PMS symptoms in January, but no menses for two months.  She took two pregnancy tests and they were negative.  She is having unprotected intercourse with her boyfriend.  She had a fibroid removal this past August.    She notes that she is gaining weight, more stubborn belly fat.  She is eating right and running 3-4 miles.  Weight is not changing, so she has added in desserts and has not gained weight either.  She is wondering about what I thought about hormone replacement therapy.  Discussed that she does not meet criteria for menopause at this time.  Discussed that hormone creams are done by bioidentical hormone practitioners.  Unfortunately we do not have any at this office.  Patient is okay with her symptoms as they are.  We did discuss greater caloric restriction, but patient has a history of eating disorder and feels comfortable where she is at.    Objective     Visit Vitals     /74 (Patient Site: Left Arm, Patient Position: Sitting)     Pulse 66     LMP 02/11/2017     SpO2 99%     Breastfeeding No     General appearance: alert, appears stated age and cooperative   Psych Exam:  Behavior:normal    Mood: Upset about psych eval, but overall pleasant.  Affect:normal   Eye Contact:normal   Thought Content: normal   Insight:normal    Judgement: normal     Reviewed neuropsychologic evaluation.  Please see scanned reports.    Marisel was seen today for results and menopause.    Diagnoses and all orders for this visit:    Generalized anxiety disorder and Depression  -     FLUoxetine (PROZAC) 40 MG capsule; Take 1 capsule (40 mg total) by mouth daily.  -     buPROPion (WELLBUTRIN XL) 150 MG 24 hr tablet; Take 1 tablet (150 mg total) by mouth daily.   Symptoms of anxiety and depression are well controlled on the fluoxetine, however patient has difficulties with focus.  Will add wellbutrin for focus.  Discussed use, potential side effects and expected " outcomes of the medication.  Discussed taking it in the morning.  Recommend follow up in 6 weeks.    Abnormal weight gain  -     T4, Free  -     Thyroid Cascade    > 25 min spent with patient.  > 50% in counseling and coordination of care.
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
## 2021-06-13 NOTE — PROGRESS NOTES
FEMALE ADULT PREVENTIVE EXAM    CHIEF COMPLAINT:  Female preventive exam.    SUBJECTIVE:  Marisel Burgos is a 42 y.o. female who presents for her routine physical exam.    Patient would like to address the following concerns today: None.  As an update:    Moving in with boyfriend and 3 kids.      Went to [facility] and had a test and was confirmed to be menopausal.  Doing the combipatch.  [place] has improved, better lubrication.      Had flu shot through work.  Had Tdap 2 years ago at [facility].    Had cold laser surgery for liposuction.      GYNE HISTORY  Menses: not since July, getting hot flashes.  Sexually Active: with boyfriend x 1.5 years.  Buying a house.    Contraception: none necessary.  Last Pap: 2012   Abnormal Pap: LEEP - at age 29  Vaginal Discharge: none      She  has a past medical history of Anxiety and Depression.    Lab Results   Component Value Date    WBC 5.2 10/28/2013    HGB 12.7 10/28/2013    HCT 37.9 10/28/2013    MCV 94 10/28/2013     10/28/2013     Lab Results   Component Value Date    CHOL 243 (H) 10/06/2017    HDL 57 10/06/2017    LDLCALC 150 (H) 10/06/2017    TRIG 179 (H) 10/06/2017     Lab Results   Component Value Date    TSH 0.97 02/13/2017     BP Readings from Last 3 Encounters:   10/06/17 100/64   03/16/17 112/62   02/13/17 102/74       Surgeries:    Past Surgical History:   Procedure Laterality Date     DE APPENDECTOMY      Description: Appendectomy;  Recorded: 11/10/2012;  Comments: Sept 2012     DE REMOVAL OF OVARIAN CYST(S)      Description: Ovarian Cystectomy;  Recorded: 08/15/2013;  Comments: Right, 2013 - benign     UTERINE FIBROID SURGERY  08/2016    at [facility]     WISDOM TOOTH EXTRACTION         Family History:  Her family history includes Cancer in her father; Hypertension in her sister; Lung cancer in her mother; Melanoma (age of onset: 43) in her sister; Mental illness in her mother; No Medical Problems in her sister, sister, and sister; Thyroid disease in her mother. There  is no history of Breast cancer, Colon cancer, or Clotting disorder.    Social History:  She  reports that she quit smoking about 7 years ago. She started smoking about 24 years ago. She has never used smokeless tobacco. She reports that she does not drink alcohol or use illicit drugs.    Medications:    Current Outpatient Prescriptions:      minocycline (MINOCIN,DYNACIN) 100 MG capsule, Take 100 mg by mouth 2 (two) times a day., Disp: , Rfl:      buPROPion (WELLBUTRIN XL) 150 MG 24 hr tablet, Take 1 tablet (150 mg total) by mouth every morning., Disp: 90 tablet, Rfl: 0     COMBIPATCH 0.05-0.14 mg/24 hr, , Disp: , Rfl: 2     FLUoxetine (PROZAC) 40 MG capsule, Take 1 capsule (40 mg total) by mouth daily., Disp: 90 capsule, Rfl: 0     hydroquinone 4 % cream, , Disp: , Rfl: 0      Allergies:  No latex allergies.  No Known Allergies       RISK BEHAVIOR & HEALTH HABITS  Self Breast Exam: no.  Regular Exercise: [employer].    Balanced diet: Eats on the road during lunch, no french fries.  Healthy.  Seat Belt Use: yes  Calcium intake/Osteoporosis prevention: through dairy.      Guns: NO  Sun Screen: YES  Dental Care: YES    REVIEW OF SYSTEMS:  Complete head to toe review of systems is otherwise negative except as above.    OBJECTIVE:  VITAL SIGNS:    Vitals:    10/06/17 1055   BP: 100/64   Pulse: 70   SpO2: 99%     GENERAL:  Patient alert, in no acute distress.  EYES: PERRLA. Extraoccular movements intact, pupils equal, reactive to light and accommodation.  Normal conjunctiva and lids.  Undilated fudoscopic exam normal, including normal size, appearance cup-to-disc ratio.  Normal posterior segments, including no exudates or hemorrhages.  ENT:  Hearing grossly normal.  Normal appearance to ears and nose.  Bilateral TM s, external canals, oropharynx normal. Normal lips, gums and teeth.  Normal nasal mucosa, septum and turbinates.  NECK:  Supple, without thyromegaly or mass.  RESP:  Clear to auscultation without crackles,  wheezes or distress.  Normal respiratory effort.   CV:  Regular rate and rhythm without murmurs, rubs or gallops.  Normal carotid, abdominal aorta, femoral and pedal pulses.  No varicosities or edema.  ABDOMEN:  Soft, non-tender, without hepatosplenomegaly, masses, or hernias.   BREASTS:  Nontender, without masses, nipple discharge, erythema, or axillary adenopathy.    :    External genitalia:  Normal without lesions.  Urethra: Normal appearing  Vagina: Normal without discharge  Cervix: normal  Uterus:  Nontender, mobile, without masses  Ovaries: Normal without masses  LYMPHATIC: Normal palpation of neck, groin and axilla..  No lymphadenopathy.  No bruising.  NEURO:  CN II-XII intact, motor & sensory function all intact.  DTR and reflexes normal.  PSYCHIATRIC:  Alert & oriented with normal mood and affect.  Good judgment and insight.  SKIN:  Normal inspection and palpation.  MUSCULOSKELETAL: Normal gait and station.  - Spine / Ribs / Pelvis: Normal inspection, ROM, stability and strength: Spine, Head, Neck, Upper and Lower Extremities.    ASSESSMENT & PLAN  Marisel was seen today for annual exam.    Diagnoses and all orders for this visit:    Encounter for routine history and physical exam in female patient  Patient has a lot of changes in her life at this time.  She has made a lot of changes towards a healthier lifestyle.  Reviewed family history for high risk screening.    Hyperlipidemia  -     Lipid Cascade  Recheck her lipid cascade today now that she is exercising regularly as well as had some significant weight loss.    Pap smear for cervical cancer screening  -     Gynecologic Cytology (PAP Smear)  -     HPV Cascade (PCR)  Discussed [facility] guidelines for screening.

## 2021-06-14 NOTE — ANESTHESIA POSTPROCEDURE EVALUATION
Patient: Marisel Burgos  Procedure(s):  DILATION AND CURETTAGE, UTERUS, USING SUCTION  Anesthesia type: MAC    Patient location: Phase II Recovery  Last vitals:   Vitals Value Taken Time   /72 01/25/21 1350   Temp 36.3  C (97.3  F) 01/25/21 1333   Pulse 77 01/25/21 1350   Resp 16 01/25/21 1350   SpO2 97 % 01/25/21 1350     Post vital signs: stable  Level of consciousness: awake and responds to simple questions  Post-anesthesia pain: pain controlled  Post-anesthesia nausea and vomiting: no  Pulmonary: unassisted, return to baseline  Cardiovascular: stable and blood pressure at baseline  Hydration: adequate  Anesthetic events: no    QCDR Measures:  ASA# 11 - Demi-op Cardiac Arrest: ASA11B - Patient did NOT experience unanticipated cardiac arrest  ASA# 12 - Demi-op Mortality Rate: ASA12B - Patient did NOT die  ASA# 13 - PACU Re-Intubation Rate: NA - No ETT / LMA used for case  ASA# 10 - Composite Anes Safety: ASA10A - No serious adverse event    Additional Notes:

## 2021-06-14 NOTE — ANESTHESIA CARE TRANSFER NOTE
Last vitals:   Vitals:    01/25/21 1333   BP: 113/57   Pulse: 72   Resp: 16   Temp: 36.3  C (97.3  F)   SpO2: 96%     Patient's level of consciousness is drowsy  Spontaneous respirations: yes  Maintains airway independently: yes  Dentition unchanged: yes  Oropharynx: oropharynx clear of all foreign objects    QCDR Measures:  ASA# 20 - Surgical Safety Checklist: WHO surgical safety checklist completed prior to induction    PQRS# 430 - Adult PONV Prevention: 4558F - Pt received => 2 anti-emetic agents (different classes) preop & intraop  ASA# 8 - Peds PONV Prevention: NA - Not pediatric patient, not GA or 2 or more risk factors NOT present  PQRS# 424 - Demi-op Temp Management: 4559F - At least one body temp DOCUMENTED => 35.5C or 95.9F within required timeframe  PQRS# 426 - PACU Transfer Protocol: - Transfer of care checklist used  ASA# 14 - Acute Post-op Pain: ASA14B - Patient did NOT experience pain >= 7 out of 10

## 2021-06-14 NOTE — PROGRESS NOTES
"HPI:  Patient is a 46 yo female who presents for pregnancy confirmation.    Patient had been working with [facility] for her fertility.  Patient had IVF x 8 rounds with donor egg.  She is about 7 weeks pregnant and has 1 more appointment with reproductive endocrinology on 2021.  She is wondering about options for delivery.    Discussed implications of first pregnancy in advanced maternal age.  She has already had genetic testing for this pregnancy. Reviewed increased monitoring, the increased potential for GDM and gestational hypertension.  Patient is most interested in primary  section for delivery as well.      She is currently taking her PNV.  She is taking her fluoxetine 40 mg and Wellbutrin  mg daily.  We reviewed pregnancy with both of these medications.  She has quite a number of stressors currently.  Her sister got breast cancer and is an alcoholic.  She is currently walking the dogs for her sister.  Her step children continue to have challenging psychological diagnoses.  For now I would recommend that she continue on her current medication regimen.      Reviewed prenatal diet.  She makes a very balanced diet.  She is exercising by dancing with her VR, which is also time she saves for herself.    Objective     /62   Pulse 84   Ht 5' 5.75\" (1.67 m)   Wt 175 lb (79.4 kg)   LMP  (LMP Unknown)   SpO2 96%   BMI 28.46 kg/m    General appearance: alert, appears stated age and cooperative    Marisel was seen today for annual exam.    Diagnoses and all orders for this visit:    Pregnant state, incidental  Reviewed healthy diet and exercise in pregnancy.  Reviewed the anticipated course of prenatal care given advanced maternal age.    She plans to deliver by primary  section.  She will discuss with her  and let me know what she decides.2  She will have her physical exam at her initial OB visit.    Recurrent major depressive disorder, in partial remission (H)  Currently stable on " her current medication.  She has a lot of stressors currently.  Discussed safety in pregnancy, and recommend she continue on her current dose.

## 2021-06-14 NOTE — ANESTHESIA PREPROCEDURE EVALUATION
Anesthesia Evaluation      Patient summary reviewed   History of anesthetic complications (PONV)     Airway   Mallampati: II  Neck ROM: full   Pulmonary - negative ROS and normal exam                          Cardiovascular - negative ROS and normal exam  Exercise tolerance: > or = 4 METS   Neuro/Psych    (+) depression, anxiety/panic attacks,     Endo/Other - negative ROS      GI/Hepatic/Renal - negative ROS           Dental - normal exam                        Anesthesia Plan  Planned anesthetic: MAC    ASA 1     Anesthetic plan and risks discussed with: patient    Post-op plan: routine recovery

## 2021-06-17 NOTE — TELEPHONE ENCOUNTER
Refill Approved    Rx renewed per Medication Renewal Policy. Medication was last renewed on 3/12/20.    Bimal Shetty, [facility] Triage/Med Refill 4/30/2021     Requested Prescriptions   Pending Prescriptions Disp Refills     buPROPion (WELLBUTRIN XL) 300 MG 24 hr tablet [Pharmacy Med Name: BUPROPION HCL  MG TABLET] 90 tablet 2     Sig: TAKE 1 TABLET BY MOUTH EVERY DAY       Tricyclics/Misc Antidepressant/Antianxiety Meds Refill Protocol Passed - 4/28/2021 11:04 AM        Passed - PCP or prescribing provider visit in last year     Last office visit with prescriber/PCP: 12/12/2019 Bbee Palmer MD OR same dept: Visit date not found OR same specialty: 12/12/2019 Bebe Palmer MD  Last physical: 1/7/2021 Last MTM visit: Visit date not found   Next visit within 3 mo: Visit date not found  Next physical within 3 mo: Visit date not found  Prescriber OR PCP: Bebe Palmer MD  Last diagnosis associated with med order: 1. Recurrent major depressive disorder, in partial remission (H)  - buPROPion (WELLBUTRIN XL) 300 MG 24 hr tablet [Pharmacy Med Name: BUPROPION HCL  MG TABLET]; TAKE 1 TABLET BY MOUTH EVERY DAY  Dispense: 90 tablet; Refill: 2    If protocol passes may refill for 12 months if within 3 months of last provider visit (or a total of 15 months).
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
## 2021-06-25 NOTE — TELEPHONE ENCOUNTER
Refill Approved    Rx renewed per Medication Renewal Policy. Medication was last renewed on 3/12/20, last OV 1/7/21.    Lena Pitts, [facility] Triage/Med Refill 6/5/2021     Requested Prescriptions   Pending Prescriptions Disp Refills     FLUoxetine (PROZAC) 40 MG capsule [Pharmacy Med Name: FLUOXETINE HCL 40 MG CAPSULE] 90 capsule 2     Sig: TAKE 1 CAPSULE BY MOUTH EVERY DAY       SSRI Refill Protocol  Passed - 6/4/2021  7:54 AM        Passed - PCP or prescribing provider visit in last year     Last office visit with prescriber/PCP: 12/12/2019 Bebe Palmer MD OR same dept: Visit date not found OR same specialty: 12/12/2019 Bebe Palmer MD  Last physical: 1/7/2021 Last MTM visit: Visit date not found   Next visit within 3 mo: Visit date not found  Next physical within 3 mo: Visit date not found  Prescriber OR PCP: Bebe Palmer MD  Last diagnosis associated with med order: 1. Generalized anxiety disorder  - FLUoxetine (PROZAC) 40 MG capsule [Pharmacy Med Name: FLUOXETINE HCL 40 MG CAPSULE]; TAKE 1 CAPSULE BY MOUTH EVERY DAY  Dispense: 90 capsule; Refill: 2    2. Major depression, recurrent (H)  - FLUoxetine (PROZAC) 40 MG capsule [Pharmacy Med Name: FLUOXETINE HCL 40 MG CAPSULE]; TAKE 1 CAPSULE BY MOUTH EVERY DAY  Dispense: 90 capsule; Refill: 2    If protocol passes may refill for 12 months if within 3 months of last provider visit (or a total of 15 months).

## 2021-09-09 DIAGNOSIS — G43.101 MIGRAINE WITH AURA AND WITH STATUS MIGRAINOSUS, NOT INTRACTABLE: ICD-10-CM

## 2021-09-10 NOTE — TELEPHONE ENCOUNTER
Rx Authorization:  Requested Medication/ Dose SUMAtriptan (IMITREX) 50 MG tablet  Date last refill ordered: 12 Tablets  Quantity ordered: 12 tablets  # refills: 6  Date of last clinic visit with ordering provider: 12/9/19  Date of next clinic visit with ordering provider:   All pertinent protocol data (lab date/result):   Include pertinent information from patients message:

## 2021-09-14 RX ORDER — SUMATRIPTAN 50 MG/1
50-100 TABLET, FILM COATED ORAL
Qty: 12 TABLET | Refills: 4 | OUTPATIENT
Start: 2021-09-14

## 2021-10-14 ENCOUNTER — VIRTUAL VISIT (OUTPATIENT)
Dept: NEUROLOGY | Facility: CLINIC | Age: 46
End: 2021-10-14
Payer: COMMERCIAL

## 2021-10-14 DIAGNOSIS — G43.101 MIGRAINE WITH AURA AND WITH STATUS MIGRAINOSUS, NOT INTRACTABLE: Primary | ICD-10-CM

## 2021-10-14 PROCEDURE — 99202 OFFICE O/P NEW SF 15 MIN: CPT | Mod: 95 | Performed by: NURSE PRACTITIONER

## 2021-10-14 RX ORDER — PROPRANOLOL HYDROCHLORIDE 20 MG/1
20 TABLET ORAL AT BEDTIME
Qty: 30 TABLET | Refills: 3 | Status: SHIPPED | OUTPATIENT
Start: 2021-10-14 | End: 2021-11-09

## 2021-10-14 RX ORDER — RIZATRIPTAN BENZOATE 5 MG/1
5-10 TABLET, ORALLY DISINTEGRATING ORAL
Qty: 18 TABLET | Refills: 6 | Status: SHIPPED | OUTPATIENT
Start: 2021-10-14 | End: 2022-10-20

## 2021-10-14 NOTE — PATIENT INSTRUCTIONS
Plan:  Headache log -frequency, severity  A trial of propranolol 20 mg at bedtime and will increase dose per tolerance and response. Stop if any dizziness, fainting, worsening mood   A trial of rizatriptan as needed for severe migraine headache treatment. Side effects-drowsiness, fatigue  Follow up in 1-2 months or sooner if needed       Patient Education     Propranolol HCl Oral Tablet 20 mg  Uses  This medicine is used for the following purposes:    angina    heart attack    heart disease    high blood pressure    high thyroid levels    irregular heart beat    prevent migraine headaches    movement disorder    prevent bleeding    tremors  Instructions  Take the medicine on an empty stomach.  It is very important that you take the medicine at about the same time every day. It will work best if you do this.  Keep the medicine at room temperature. Avoid heat and direct light.  It is important that you keep taking each dose of this medicine on time even if you are feeling well.  If you forget to take a dose on time, take it as soon as you remember. If it is almost time for the next dose, do not take the missed dose. Return to your normal dosing schedule. Do not take 2 doses of this medicine at one time.  Please tell your doctor and pharmacist about all the medicines you take. Include both prescription and over-the-counter medicines. Also tell them about any vitamins, herbal medicines, or anything else you take for your health.  This medicine may cause low blood sugar. It is very important to have regular meals and exercise regularly as instructed by your doctor. Notify your doctor if you experience symptoms of low blood sugar.  If you have diabetes, this medicine may hide some signs of low blood sugar, such as fast heartbeat. Check your blood sugar regularly and for other signs of low blood sugar.  Symptoms of low blood sugar may include nausea, shaking, sweating, cold skin, fast heartbeat, hunger, and  irritability.  Do not suddenly stop taking this medicine. Check with your doctor before stopping.  It is very important that you follow your doctor's instructions for all blood tests.  Cautions  Tell your doctor and pharmacist if you ever had an allergic reaction to a medicine. Symptoms of an allergic reaction can include trouble breathing, skin rash, itching, swelling, or severe dizziness.  Some patients with weak hearts may have worsening of symptoms. If you notice difficulty breathing, weight gain, or swelling of your legs or ankles, let your doctor know right away.  Do not use the medication any more than instructed.  Your ability to stay alert or to react quickly may be impaired by this medicine. Do not drive or operate machinery until you know how this medicine will affect you.  Please check with your doctor before drinking alcohol while on this medicine.  Tell the doctor or pharmacist if you are pregnant, planning to be pregnant, or breastfeeding.  Ask your pharmacist if this medicine can interact with any of your other medicines. Be sure to tell them about all the medicines you take.  Please tell all your doctors and dentists that you are on this medicine before they provide care.  Do not start or stop any other medicines without first speaking to your doctor or pharmacist.  Do not share this medicine with anyone who has not been prescribed this medicine.  Side Effects  The following is a list of some common side effects from this medicine. Please speak with your doctor about what you should do if you experience these or other side effects.    agitated feeling or trouble sleeping    dizziness    bad dreams    lack of energy and tiredness    cold hands or feet    headaches    low blood pressure    nausea    stomach upset or abdominal pain  Call your doctor or get medical help right away if you notice any of these more serious side effects:    chest pain    changes in memory, mood, or  thinking    confusion    depression or feeling sad    swelling of the legs, feet, and hands    fainting    slow heartbeat    pain in the joints    feeling of numbness or tingling in your hands and feet    pale or blue skin, lips or fingernails    problems with sexual functions or desire    shortness of breath    unusual or unexplained tiredness or weakness    sudden or unexplained weight gain  A few people may have an allergic reactions to this medicine. Symptoms can include difficulty breathing, skin rash, itching, swelling, or severe dizziness. If you notice any of these symptoms, seek medical help quickly.  Extra  Please speak with your doctor, nurse, or pharmacist if you have any questions about this medicine.  [email or web address]  IMPORTANT NOTE: This document tells you briefly how to take your medicine, but it does not tell you all there is to know about it.Your doctor or pharmacist may give you other documents about your medicine. Please talk to them if you have any questions.Always follow their advice. There is a more complete description of this medicine available in English.Scan this code on your smartphone or tablet or use the web address below. You can also ask your pharmacist for a printout. If you have any questions, please ask your pharmacist.     2021 [employer].           Patient Education     Rizatriptan ODT 5 mg  Uses  For headache.  Instructions  Let the medicine dissolve on your tongue and then swallow.  Do not chew or swallow it whole.  Keep the medicine in its original container.  Start taking this medicine as soon as possible or as instructed by your doctor.  Peel back the foil backing and carefully remove the tablet using dry hands. Do not try to push the tablet through the foil.  Store at room temperature in a dry place. Do not keep in the bathroom.  Keep the medicine away from heat and light.  Please tell your doctor and pharmacist about all the  medicines you take. Include both prescription and over-the-counter medicines. Also tell them about any vitamins, herbal medicines, or anything else you take for your health.  If your symptoms do not improve or they worsen while on this medicine, contact your doctor.  Do not take the medicine more than twice during 24 hours.  Cautions  Tell your doctor and pharmacist if you ever had an allergic reaction to a medicine. Symptoms of an allergic reaction can include trouble breathing, skin rash, itching, swelling, or severe dizziness.  Some patients with weak hearts may have worsening of symptoms. If you notice difficulty breathing, weight gain, or swelling of your legs or ankles, let your doctor know right away.  This medicine is associated with a rare but very serious medical condition. Please speak with your doctor about symptoms you should look out for while on this medicine. Notify your doctor immediately if you develop those symptoms.  Some patients taking this medicine have experienced serious side effects. Please speak with your doctor to understand the risks and benefits associated with this medicine.  This medicine is associated with an increased risk of serious heart problems, heart attack, and stroke. Please speak with your doctor about the risks and benefits of using this medicine. Contact your doctor immediately if you experience chest pain or difficulty breathing.  Do not use the medication any more than instructed.  This medicine may cause dizziness or fainting, especially after exercising or in hot weather. Be very careful when standing or sitting up quickly.  Your ability to stay alert or to react quickly may be impaired by this medicine. Do not drive or operate machinery until you know how this medicine will affect you.  Please check with your doctor before drinking alcohol while on this medicine.  If you drink more than a few alcoholic beverages each day, ask your doctor whether you should be on this  medicine.  Call the doctor if there are any signs of confusion or unusual changes in behavior.  Tell the doctor or pharmacist if you are pregnant, planning to be pregnant, or breastfeeding.  Do not take London's wort while on this medicine.  Ask your pharmacist if this medicine can interact with any of your other medicines. Be sure to tell them about all the medicines you take.  Please tell all your doctors and dentists that you are on this medicine before they provide care.  Do not start or stop any other medicines without first speaking to your doctor or pharmacist.  If you have had a heart attack or a stroke within the past 6 months, talk to your doctor before using this medicine.  Do not share this medicine with anyone who has not been prescribed this medicine.  Side Effects  The following is a list of some common side effects from this medicine. Please speak with your doctor about what you should do if you experience these or other side effects.    dizziness    drowsiness or sedation    lack of energy and tiredness    feeling of heat or flushing    weakness  Call your doctor or get medical help right away if you notice any of these more serious side effects:    agitated feeling or trouble sleeping    decreased awareness or responsiveness    loss of balance    chest pain    changes in memory, mood, or thinking    severe, watery or bloody diarrhea    fainting    cold hands or feet    fast or irregular heart beats    muscle trembling    pale or blue skin, lips or fingernails    restlessness    ringing in the ears    severe stomach pain that spreads to the back    symptoms of stroke (such as one-sided weakness, slurred speech, confusion)    blurring or changes of vision    severe or persistent vomiting  A few people may have an allergic reactions to this medicine. Symptoms can include difficulty breathing, skin rash, itching, swelling, or severe dizziness. If you notice any of these symptoms, seek medical help  quickly.  Extra  Please speak with your doctor, nurse, or pharmacist if you have any questions about this medicine.  [email or web address]  IMPORTANT NOTE: This document tells you briefly how to take your medicine, but it does not tell you all there is to know about it.Your doctor or pharmacist may give you other documents about your medicine. Please talk to them if you have any questions.Always follow their advice. There is a more complete description of this medicine available in English.Scan this code on your smartphone or tablet or use the web address below. You can also ask your pharmacist for a printout. If you have any questions, please ask your pharmacist.     2021 [employer]

## 2021-10-14 NOTE — PROGRESS NOTES
Marisel is a 46 year old who is being evaluated via a billable video visit.      How would you like to obtain your AVS? MyChart  If the video visit is dropped, the invitation should be resent by: Text to cell phone: [phone]  Will anyone else be joining your video visit? No      Video Start Time: MIGRAINE DISABILITY ASSESSMENT (MIDAS)    On how many days in the last 3 months did you miss work or school because of your headaches?  5    How many days in the last 3 months was your productivity at work or school reduced by half or more because of your headaches? (Do not include days you counted in question 1 where you missed work or school.)  24    On how many days in the last 3 months did you not do household work (such as housework, home repairs and maintenance, shopping, caring for children and relatives) because of your headaches?  3    How many days in the last 3 months was your productivity in household work reduced by half or more because of your headaches? (Do not include days you counted in question 3 where you did not do household work).  24    On how many days in the last 3 months did you miss family, social, or lesiure activities because of your headaches?  12    MIDAS Total Score:     On how many days in the last 3 months did you have a headache? (If a headache lasted more than 1 day, count each day.)   24    On a scale of 0 - 10, on average how painful were these headaches (where 0 = no pain at all, and 10 = pain as bad as it can be.)  6    Video Start Time: 8:39 AM        Subjective   Marisel is a 46 year old who presents for the following health issues -headache follow up   Initial Headache Clinic visit on 12/09/2019, see note for details.   Patient returns to our clinic today to review her rescue treatment and headache prevention.   Reports that sumatriptan is Ok but headaches duration about 3 days. Sumatriptan causes a discomfort sensation in the limbs and weird sensation and drowsiness-sleeps for  12 hours. Patient is opened to try other treatments.   Headaches a couple times per week. Headaches more frequent now-6-8 per month and appears long lasting.   Reviewed options and would recommend old/traditional medications with a good safety record given the fact patient is trying to get pregnant. She is followed by fertility medicine.   No new headache symptoms but more frequent now possibly due to stress  Patient is opened to try another triptan to see if works better than sumatriptan  On bupropion and fluoxetine -currently on   Depression is under control      Headache treatment plan:  Headache log -frequency, severity  A trial of propranolol 20 mg at bedtime and will increase dose per tolerance and response.  Side effects reviewed.  Stop if any dizziness, fainting, worsening mood   A trial of rizatriptan as needed for severe migraine headache treatment. Side effects-drowsiness, fatigue.  Limit use of rizatriptan to no more than 9 days/month  Follow up in 1-2 months or sooner if needed       Review of Systems   Constitutional, HEENT, cardiovascular, pulmonary, gi and gu systems are negative, except as otherwise noted.      Objective    Vitals - Patient Reported  Pain Score: Moderate Pain (5)  Pain Loc: Head (right side of head from neck to eye)        Physical Exam   GENERAL: Healthy, alert and no distress  EYES: Eyes grossly normal to inspection.    RESP: No audible wheeze, cough, or visible cyanosis.  No visible retractions or increased work of breathing.    SKIN: Visible skin clear.   NEURO: Face is symmetrical and symmetrical facial expressions, no apparent weakness mentation and speech appropriate for age.  PSYCH: Mentation appears normal, affect normal/bright, judgement and insight intact, normal speech and appearance well-groomed.      I discussed all my recommendations with Marisel Jarrett who verbalizes understanding and comfortable with the plan.  All of patient's questions were answered from  the best of my knowledge.  Patient is in agreement with the plan.      25 minutes spent on the date of the encounter doing video access, chart  review,meds review, treatment plan, documentation and further activities as noted above    MATTI Hudson, CNP [facility]  Headache certified  [facility]      Video-Visit Details    Type of service:  Video Visit    Video End Time:9:03 AM    Originating Location (pt. Location): Home    Distant Location (provider location):  [facility] [place]     Platform used for Video Visit: MAYKOR

## 2021-10-14 NOTE — LETTER
10/14/2021       RE: Marisel Jarrett  [address] [place] [address]     Dear Colleague,    Thank you for referring your patient, Marisel Jarrett, to the [facility] NEUROLOGY [facility]IC [place] at [facility]. Please see a copy of my visit note below.    Video Start Time: MIGRAINE DISABILITY ASSESSMENT (MIDAS)    On how many days in the last 3 months did you miss work or school because of your headaches?  5    How many days in the last 3 months was your productivity at work or school reduced by half or more because of your headaches? (Do not include days you counted in question 1 where you missed work or school.)  24    On how many days in the last 3 months did you not do household work (such as housework, home repairs and maintenance, shopping, caring for children and relatives) because of your headaches?  3    How many days in the last 3 months was your productivity in household work reduced by half or more because of your headaches? (Do not include days you counted in question 3 where you did not do household work).  24    On how many days in the last 3 months did you miss family, social, or lesiure activities because of your headaches?  12    MIDAS Total Score:     On how many days in the last 3 months did you have a headache? (If a headache lasted more than 1 day, count each day.)   24    On a scale of 0 - 10, on average how painful were these headaches (where 0 = no pain at all, and 10 = pain as bad as it can be.)  6    Video Start Time: 8:39 AM        Rajni Joiner is a 46 year old who presents for the following health issues -headache follow up   Initial Headache Clinic visit on 12/09/2019, see note for details.   Patient returns to our clinic today to review her rescue treatment and headache prevention.   Reports that sumatriptan is Ok but headaches duration about 3 days. Sumatriptan causes a discomfort sensation in the limbs  and weird sensation and drowsiness-sleeps for 12 hours. Patient is opened to try other treatments.   Headaches a couple times per week. Headaches more frequent now-6-8 per month and appears long lasting.   Reviewed options and would recommend old/traditional medications with a good safety record given the fact patient is trying to get pregnant. She is followed by fertility medicine.   No new headache symptoms but more frequent now possibly due to stress  Patient is opened to try another triptan to see if works better than sumatriptan  On bupropion and fluoxetine -currently on   Depression is under control      Headache treatment plan:  Headache log -frequency, severity  A trial of propranolol 20 mg at bedtime and will increase dose per tolerance and response.  Side effects reviewed.  Stop if any dizziness, fainting, worsening mood   A trial of rizatriptan as needed for severe migraine headache treatment. Side effects-drowsiness, fatigue.  Limit use of rizatriptan to no more than 9 days/month  Follow up in 1-2 months or sooner if needed       Review of Systems   Constitutional, HEENT, cardiovascular, pulmonary, gi and gu systems are negative, except as otherwise noted.      Objective    Vitals - Patient Reported  Pain Score: Moderate Pain (5)  Pain Loc: Head (right side of head from neck to eye)        Physical Exam   GENERAL: Healthy, alert and no distress  EYES: Eyes grossly normal to inspection.    RESP: No audible wheeze, cough, or visible cyanosis.  No visible retractions or increased work of breathing.    SKIN: Visible skin clear.   NEURO: Face is symmetrical and symmetrical facial expressions, no apparent weakness mentation and speech appropriate for age.  PSYCH: Mentation appears normal, affect normal/bright, judgement and insight intact, normal speech and appearance well-groomed.      I discussed all my recommendations with Marisel Jarrett who verbalizes understanding and comfortable with the plan.   All of patient's questions were answered from the best of my knowledge.  Patient is in agreement with the plan.      25 minutes spent on the date of the encounter doing video access, chart  review,meds review, treatment plan, documentation and further activities as noted above    MATTI Hudson, CNP [facility]  Headache certified  [facility]      Again, thank you for allowing me to participate in the care of your patient.      Sincerely,    MATTI Gavin CNP

## 2021-11-07 DIAGNOSIS — G43.101 MIGRAINE WITH AURA AND WITH STATUS MIGRAINOSUS, NOT INTRACTABLE: ICD-10-CM

## 2021-11-08 NOTE — TELEPHONE ENCOUNTER
Rx Authorization:  Requested Medication/ Dose PROPRANOLOL 20 MG TABLET  Date last refill ordered: 10/14/21  Quantity ordered: 30 tabs  # refills: 3  Date of last clinic visit with ordering provider: 10/14/21  Date of next clinic visit with ordering provider: 11/26/21  All pertinent protocol data (lab date/result):   Include pertinent information from patients message:

## 2021-11-09 RX ORDER — PROPRANOLOL HYDROCHLORIDE 20 MG/1
TABLET ORAL
Qty: 30 TABLET | Refills: 1 | Status: SHIPPED | OUTPATIENT
Start: 2021-11-09 | End: 2021-12-14

## 2021-11-26 ENCOUNTER — VIRTUAL VISIT (OUTPATIENT)
Dept: NEUROLOGY | Facility: CLINIC | Age: 46
End: 2021-11-26
Payer: COMMERCIAL

## 2021-11-26 DIAGNOSIS — G43.101 MIGRAINE WITH AURA AND WITH STATUS MIGRAINOSUS, NOT INTRACTABLE: Primary | ICD-10-CM

## 2021-11-26 PROCEDURE — 99441 PR PHYSICIAN TELEPHONE EVALUATION 5-10 MIN: CPT | Performed by: NURSE PRACTITIONER

## 2021-11-26 ASSESSMENT — HEADACHE IMPACT TEST (HIT 6)
WHEN YOU HAVE A HEADACHE HOW OFTEN DO YOU WISH YOU COULD LIE DOWN: ALWAYS
HOW OFTEN HAVE YOU FELT FED UP OR IRRITATED BECAUSE OF YOUR HEADACHES: ALWAYS
HOW OFTEN HAVE YOU FELT FED UP OR IRRITATED BECAUSE OF YOUR HEADACHES: ALWAYS
HIT6 TOTAL SCORE: 64
WHEN YOU HAVE HEADACHES HOW OFTEN IS THE PAIN SEVERE: VERY OFTEN
HOW OFTEN DID HEADACHS LIMIT CONCENTRATION ON WORK OR DAILY ACTIVITY: RARELY
WHEN YOU HAVE A HEADACHE HOW OFTEN DO YOU WISH YOU COULD LIE DOWN: ALWAYS
HOW OFTEN DID HEADACHS LIMIT CONCENTRATION ON WORK OR DAILY ACTIVITY: RARELY
WHEN YOU HAVE HEADACHES HOW OFTEN IS THE PAIN SEVERE: VERY OFTEN
WHEN YOU HAVE A HEADACHE HOW OFTEN DO YOU WISH YOU COULD LIE DOWN: ALWAYS
HOW OFTEN DO HEADACHES LIMIT YOUR DAILY ACTIVITIES: VERY OFTEN
WHEN YOU HAVE HEADACHES HOW OFTEN IS THE PAIN SEVERE: VERY OFTEN
HOW OFTEN HAVE YOU FELT TOO TIRED TO WORK BECAUSE OF YOUR HEADACHES: RARELY
HOW OFTEN HAVE YOU FELT FED UP OR IRRITATED BECAUSE OF YOUR HEADACHES: ALWAYS
HOW OFTEN DO HEADACHES LIMIT YOUR DAILY ACTIVITIES: VERY OFTEN
HOW OFTEN DID HEADACHS LIMIT CONCENTRATION ON WORK OR DAILY ACTIVITY: RARELY
HOW OFTEN HAVE YOU FELT TOO TIRED TO WORK BECAUSE OF YOUR HEADACHES: RARELY
HOW OFTEN HAVE YOU FELT TOO TIRED TO WORK BECAUSE OF YOUR HEADACHES: RARELY
HIT6 TOTAL SCORE: 64
HOW OFTEN DO HEADACHES LIMIT YOUR DAILY ACTIVITIES: VERY OFTEN
HIT6 TOTAL SCORE: 64

## 2021-11-26 NOTE — PATIENT INSTRUCTIONS
Plan:  No changes   Continue propranolol 20 mg at bedtime for now and may increase dose to 20 mg twice daily if headaches were increasing  Rescue treatment -rizatriptan as needed   Follow up in 6 months or sooner if needed

## 2021-11-26 NOTE — LETTER
11/26/2021       RE: Marisel Jarrett  [address] [place] [address]     Dear Colleague,    Thank you for referring your patient, Marisel Jarrett, to the [facility] NEUROLOGY CLINIC [place] [facility]. Please see a copy of my visit note below.    MIGRAINE DISABILITY ASSESSMENT (MIDAS)    On how many days in the last 3 months did you miss work or school because of your headaches?  4    How many days in the last 3 months was your productivity at work or school reduced by half or more because of your headaches? (Do not include days you counted in question 1 where you missed work or school.)  5    On how many days in the last 3 months did you not do household work (such as housework, home repairs and maintenance, shopping, caring for children and relatives) because of your headaches?  5    How many days in the last 3 months was your productivity in household work reduced by half or more because of your headaches? (Do not include days you counted in question 3 where you did not do household work).  5    On how many days in the last 3 months did you miss family, social, or lesiure activities because of your headaches?  2    MIDAS Total Score:     On how many days in the last 3 months did you have a headache? (If a headache lasted more than 1 day, count each day.)   2    On a scale of 0 - 10, on average how painful were these headaches (where 0 = no pain at all, and 10 = pain as bad as it can be.)  5    Last Patient-Answered HIT-6 Questionnaire  HIT-6 11/26/2021   When you have headaches, how often is the pain severe 11   How often do headaches limit your ability to do usual daily activities including household work, work, school, or social activities? 11   When you have a headache, how often do you wish you could lie down? 13   In the past 4 weeks, how often have you felt too tired to do work or daily activities because of your headaches 8    In the past 4 weeks, how often have you felt fed up or irritated because of your headaches 13   In the past 4 weeks, how often did headaches limit your ability to concentrate on work or daily activities 8   HIT-6 Total Score 64     Provider's telephone note:  Propranolol has been working at 20 mg at bedtime and no side effects  A whole month may be a couple of headaches.   Rizatriptan helps and feels less weird.   Takes a couple of hours and needs to lay down     Plan:  No changes   Continue propranolol 20 mg at bedtime for now and may increase dose to 20 mg twice daily if headaches were increasing  Rescue treatment -rizatriptan as needed   Follow up in 6 months or sooner if needed     Phone call lasted 8 minutes      MATTI Hudson, CNP [facility]  Headache certified  [facility]      Again, thank you for allowing me to participate in the care of your patient.      Sincerely,    MATTI Gavin CNP

## 2021-12-11 DIAGNOSIS — G43.101 MIGRAINE WITH AURA AND WITH STATUS MIGRAINOSUS, NOT INTRACTABLE: ICD-10-CM

## 2021-12-13 NOTE — TELEPHONE ENCOUNTER
Rx Authorization:  Requested Medication/ Dose PROPRANOLOL 20 MG TABLET  Date last refill ordered:11/9/21  Quantity ordered:30 tabs  # refills: 1  Date of last clinic visit with ordering provider: 11/26/21  Date of next clinic visit with ordering provider:   All pertinent protocol data (lab date/result):   Include pertinent information from patients message:

## 2021-12-14 RX ORDER — PROPRANOLOL HYDROCHLORIDE 20 MG/1
TABLET ORAL
Qty: 60 TABLET | Refills: 5 | Status: SHIPPED | OUTPATIENT
Start: 2021-12-14 | End: 2022-05-23

## 2021-12-29 ENCOUNTER — TELEPHONE (OUTPATIENT)
Dept: NEUROLOGY | Facility: CLINIC | Age: 46
End: 2021-12-29
Payer: COMMERCIAL

## 2021-12-29 NOTE — TELEPHONE ENCOUNTER
I returned call to pt. I let her know Isabell had a couple suggestions.   1. Could try propranolol ER which would have less potency/milder effect. May cause less of the heartburn/chest pain she experienced with the immediate release propranolol.     2. Try an antidepressant such as amitriptyline.     3. We can set up a phone visit next week to discuss options. Could add to end of clinic day per Isabell.     I asked pt call back to let me know her preference.     Emerald JENKINS

## 2021-12-29 NOTE — TELEPHONE ENCOUNTER
[facility] Call Center    Phone Message    May a detailed message be left on voicemail: yes     Reason for Call: Symptoms or Concerns     If patient has red-flag symptoms, warm transfer to triage line    Current symptom or concern: Chest pain/heart anita at night after taking propranolol (INDERAL) 20 MG tablet.     Symptoms have been present for: Only when pt take the propranolol (INDERAL) 20 MG tablet.     Has patient previously been seen for this? No    By : Isabell Porras      Are there any new or worsening symptoms? No worsening symptoms. Please advise? Should pt stop taking the medication or change it? [phone]      Action Taken: Message routed to:  [facility] ([facility]): [facility]    Travel Screening: Not Applicable

## 2021-12-29 NOTE — TELEPHONE ENCOUNTER
I called pt to discuss her message. She has been having severe heartburn/chest pain when taking propranolol 20mg. It only happens when she takes the medication. I advised her to stop propranolol. I will update Isabell and see what she recommends. Pt wondering if there is a different medication option she can try.     Emerald JENKINS

## 2021-12-31 ENCOUNTER — TELEPHONE (OUTPATIENT)
Dept: NEUROLOGY | Facility: CLINIC | Age: 46
End: 2021-12-31
Payer: COMMERCIAL

## 2021-12-31 NOTE — TELEPHONE ENCOUNTER
[facility] Call Center    Phone Message    May a detailed message be left on voicemail: yes     Reason for Call: Other: Patient is requesting a calll back to discuss questions about extended release medication, please call patient at [phone] to advise.     Action Taken: Message routed to:  [facility] ([facility]): [facility] Neurology    Travel Screening: Not Applicable

## 2021-12-31 NOTE — TELEPHONE ENCOUNTER
It looks like patient never called us back. Can we call her again sometime next week to see how she is doing and set up a follow up visit.     Thank you

## 2022-01-03 NOTE — TELEPHONE ENCOUNTER
"I returned pt call. She is not sure which option she wants to proceed with \"extended release propranolol or amitriptyline\". We scheduled a phone visit with Isabell to review options on 1/4 at 1:30p.       Emerald JENKINS  "

## 2022-01-10 ASSESSMENT — HEADACHE IMPACT TEST (HIT 6)
HOW OFTEN HAVE YOU FELT FED UP OR IRRITATED BECAUSE OF YOUR HEADACHES: VERY OFTEN
HIT6 TOTAL SCORE: 70
HOW OFTEN DID HEADACHS LIMIT CONCENTRATION ON WORK OR DAILY ACTIVITY: VERY OFTEN
WHEN YOU HAVE A HEADACHE HOW OFTEN DO YOU WISH YOU COULD LIE DOWN: ALWAYS
HOW OFTEN DO HEADACHES LIMIT YOUR DAILY ACTIVITIES: VERY OFTEN
WHEN YOU HAVE HEADACHES HOW OFTEN IS THE PAIN SEVERE: ALWAYS
HOW OFTEN HAVE YOU FELT TOO TIRED TO WORK BECAUSE OF YOUR HEADACHES: VERY OFTEN

## 2022-01-11 ENCOUNTER — TELEPHONE (OUTPATIENT)
Dept: NEUROLOGY | Facility: CLINIC | Age: 47
End: 2022-01-11

## 2022-01-11 ENCOUNTER — VIRTUAL VISIT (OUTPATIENT)
Dept: NEUROLOGY | Facility: CLINIC | Age: 47
End: 2022-01-11
Payer: COMMERCIAL

## 2022-01-11 DIAGNOSIS — G43.101 MIGRAINE WITH AURA AND WITH STATUS MIGRAINOSUS, NOT INTRACTABLE: Primary | ICD-10-CM

## 2022-01-11 PROCEDURE — 99212 OFFICE O/P EST SF 10 MIN: CPT | Mod: 95 | Performed by: NURSE PRACTITIONER

## 2022-01-11 RX ORDER — PROPRANOLOL HCL 60 MG
60 CAPSULE, EXTENDED RELEASE 24HR ORAL DAILY
Qty: 30 CAPSULE | Refills: 1 | Status: SHIPPED | OUTPATIENT
Start: 2022-01-11 | End: 2022-02-15

## 2022-01-11 NOTE — NURSING NOTE
Patient verified meds and allergies are correct via patients echeck in.    Stephanie Stein, Virtual Facilitator

## 2022-01-11 NOTE — TELEPHONE ENCOUNTER
Called patient to complete check out process per Return for follow up in 6-8 weeks virtually. appt with Isabell. Patient stated she would schedule on line.at a later time. .     Stephanie Stein, Virtual Facilitator, 1/11/2022

## 2022-01-11 NOTE — PROGRESS NOTES
Marisel is a 46 year old who is being evaluated via a billable video visit.      Video Start Time: 12:50 PM  Video-Visit Details    Type of service:  Video Visit    Video End Time:1:06 PM    Originating Location (pt. Location): Home    Distant Location (provider location):  [facility] NEUROLOGY [facility]     Platform used for Video Visit: [employer]    Last Patient-Answered HIT-6 Questionnaire  HIT-6 1/10/2022   When you have headaches, how often is the pain severe 13   How often do headaches limit your ability to do usual daily activities including household work, work, school, or social activities? 11   When you have a headache, how often do you wish you could lie down? 13   In the past 4 weeks, how often have you felt too tired to do work or daily activities because of your headaches 11   In the past 4 weeks, how often have you felt fed up or irritated because of your headaches 11   In the past 4 weeks, how often did headaches limit your ability to concentrate on work or daily activities 11   HIT-6 Total Score 70       MIGRAINE DISABILITY ASSESSMENT (MIDAS)    On how many days in the last 3 months did you miss work or school because of your headaches?  24    How many days in the last 3 months was your productivity at work or school reduced by half or more because of your headaches? (Do not include days you counted in question 1 where you missed work or school.)  36    On how many days in the last 3 months did you not do household work (such as housework, home repairs and maintenance, shopping, caring for children and relatives) because of your headaches?  24    How many days in the last 3 months was your productivity in household work reduced by half or more because of your headaches? (Do not include days you counted in question 3 where you did not do household work).  36    On how many days in the last 3 months did you miss family, social, or lesiure activities because of your headaches?  6    MIDAS  Total Score:     On how many days in the last 3 months did you have a headache? (If a headache lasted more than 1 day, count each day.)   24    On a scale of 0 - 10, on average how painful were these headaches (where 0 = no pain at all, and 10 = pain as bad as it can be.)  8    Has been using propranolol and has been causing a heart burn. Has been taking 20 mg at bedtime and was causing a horrible heart burn. No other side effects.   Headache wise better -propranolol helps the headaches.     May try propranolol with dinner and a few hours before bedtime or a trial of propranolol ER 60 mg at bedtime or earlier if tolerated  Rizatriptan seems to work and no heavy feeling as with sumatriptan.     Patient is on prozac and wellbutrin   Headaches are still about 15 per month     If propranolol were not tolerated than we could try monthly CGRPs Emgality vs Botox     Follow up in 6-8 weeks or sooner if needed     PMH, allergies and current prescription medications reviewed    Patient is alert and no in apparent acute distress,  mentation appears normal, judgement and insight intact, normal speech.    I discussed all my recommendations with Marisel Jarrett who verbalizes understanding and comfortable with the plan.  All of patient's questions were answered from the best of my knowledge.  Patient is in agreement with the plan.     16 minutes spent on the date of the encounter doing video access,  meds review, treatment plan, documentation and further activities as noted above    MATTI Hudson, CNP [facility]  Headache certified  [facility]

## 2022-01-18 DIAGNOSIS — G43.101 MIGRAINE WITH AURA AND WITH STATUS MIGRAINOSUS, NOT INTRACTABLE: ICD-10-CM

## 2022-01-18 RX ORDER — PROPRANOLOL HYDROCHLORIDE 20 MG/1
TABLET ORAL
Qty: 60 TABLET | Refills: 5 | OUTPATIENT
Start: 2022-01-18

## 2022-01-29 ENCOUNTER — HEALTH MAINTENANCE LETTER (OUTPATIENT)
Age: 47
End: 2022-01-29

## 2022-02-01 ENCOUNTER — TRANSFERRED RECORDS (OUTPATIENT)
Dept: HEALTH INFORMATION MANAGEMENT | Facility: CLINIC | Age: 47
End: 2022-02-01
Payer: COMMERCIAL

## 2022-02-04 ENCOUNTER — TRANSFERRED RECORDS (OUTPATIENT)
Dept: HEALTH INFORMATION MANAGEMENT | Facility: CLINIC | Age: 47
End: 2022-02-04
Payer: COMMERCIAL

## 2022-02-07 ENCOUNTER — TELEPHONE (OUTPATIENT)
Dept: FAMILY MEDICINE | Facility: CLINIC | Age: 47
End: 2022-02-07
Payer: COMMERCIAL

## 2022-02-07 NOTE — TELEPHONE ENCOUNTER
LMTCB.  PT had appt with Dr. Palmer today that she no-showed for.  PCP wanted to check in with pt, and see how she's doing.  Please also help pt reschedule.

## 2022-02-08 NOTE — TELEPHONE ENCOUNTER
Left message for patient requesting call back to clinic     Patient missed appointment with Dr. Palmer on 2/7/2022. PCP wanted to check to see how she is doing.

## 2022-02-13 DIAGNOSIS — G43.101 MIGRAINE WITH AURA AND WITH STATUS MIGRAINOSUS, NOT INTRACTABLE: ICD-10-CM

## 2022-02-14 NOTE — TELEPHONE ENCOUNTER
Rx Authorization:  Requested Medication/ Dose propranolol ER (INDERAL LA) 60 MG 24 hr capsule  Date last refill ordered: 1/11/22  Quantity ordered: 30  # refills: 1  Date of last clinic visit with ordering provider: 1/11/22  Date of next clinic visit with ordering provider:   All pertinent protocol data (lab date/result):   Include pertinent information from patients message:

## 2022-02-15 RX ORDER — PROPRANOLOL HCL 60 MG
CAPSULE, EXTENDED RELEASE 24HR ORAL
Qty: 30 CAPSULE | Refills: 5 | Status: SHIPPED | OUTPATIENT
Start: 2022-02-15 | End: 2022-10-28

## 2022-03-14 NOTE — TELEPHONE ENCOUNTER
REFERRAL INFORMATION:    Referring Provider: Self Referral     Referring Clinic:  N/A    Reason for Visit/Diagnosis: New MWM       FUTURE VISIT INFORMATION:    Appointment Date: 4/4/2022    Appointment Time: 1 PM      NOTES RECORD STATUS  DETAILS   OFFICE NOTE from Referring Provider N/A    OFFICE NOTE from Other Specialists N/A    HOSPITAL DISCHARGE SUMMARY/ ED VISITS  N/A    OPERATIVE REPORT N/A    ENDOSCOPY (EGD)  N/A    PERTINENT LABS Internal    PATHOLOGY REPORTS (RELATED) N/A    IMAGING (CT, MRI, US, XR)  N/A

## 2022-04-01 ENCOUNTER — TELEPHONE (OUTPATIENT)
Dept: ENDOCRINOLOGY | Facility: CLINIC | Age: 47
End: 2022-04-01
Payer: COMMERCIAL

## 2022-04-01 NOTE — TELEPHONE ENCOUNTER
Called informing pt to complete questionnaire on [employer] prior to visit.  Pt is scheduled with Kimberly Stein at 1:00 pm and an appt with the dietician at 2:00 pm    I will be calling you 20-30 minutes prior to get you checked in for your appointment.

## 2022-04-04 ENCOUNTER — TELEPHONE (OUTPATIENT)
Dept: ENDOCRINOLOGY | Facility: CLINIC | Age: 47
End: 2022-04-04

## 2022-04-04 ENCOUNTER — PRE VISIT (OUTPATIENT)
Dept: ENDOCRINOLOGY | Facility: CLINIC | Age: 47
End: 2022-04-04

## 2022-04-04 ENCOUNTER — VIRTUAL VISIT (OUTPATIENT)
Dept: ENDOCRINOLOGY | Facility: CLINIC | Age: 47
End: 2022-04-04
Payer: COMMERCIAL

## 2022-04-04 VITALS — HEIGHT: 65 IN | BODY MASS INDEX: 29.99 KG/M2 | WEIGHT: 180 LBS

## 2022-04-04 PROCEDURE — 99202 OFFICE O/P NEW SF 15 MIN: CPT | Mod: 95 | Performed by: PHYSICIAN ASSISTANT

## 2022-04-04 ASSESSMENT — PAIN SCALES - GENERAL: PAINLEVEL: NO PAIN (0)

## 2022-04-04 NOTE — NURSING NOTE
"Chief Complaint   Patient presents with     Consult     Consultation Weight Management       Vitals:    04/04/22 1224   Weight: 81.6 kg (180 lb)   Height: 1.651 m (5' 5\")       Body mass index is 29.95 kg/m .                        "

## 2022-04-04 NOTE — PROGRESS NOTES
"Marisel is a 46 year old who is being evaluated via a billable video visit.      How would you like to obtain your AVS? MyChart  If the video visit is dropped, the invitation should be resent by: Text to cell phone: [phone]  Will anyone else be joining your video visit? No    Video Start Time: 100    Video-Visit Details    Type of service:  Video Visit    Video End Time:1:28 PM    Originating Location (pt. Location): Home    Distant Location (provider location):  [facility] [place]     Platform used for Video Visit: [employer]     30 minutes spent on the date of the encounter doing chart review, history and exam, documentation and further activities per the note    New Medical Weight Management Consult    PATIENT:  Marisel Jarrett  MRN:         [id number]  :         1975  SUSAN:         2022    Dear Dr Bebe Palmer,    I had the pleasure of seeing your patient, Marisel Jarrett. Full intake/assessment was done to determine barriers to weight loss success and develop a treatment plan. Marisel Jarrett is a 46 year old female interested in treatment of medical problems associated with excess weight. She has a height of 5' 5\", a weight of 180 lbs 0 oz, and the calculated Body mass index is 29.95 kg/m .     Working with [facility] for the last 3 months. She has lost a few pounds, clothes fitting better and feels better but not losing more.   Lived in [facility] and more active then moved to [place] and less active and gained weight over the last 8 years from 130-185 lbs  Early menopause late 30's      Assessment & Plan   Problem List Items Addressed This Visit     BMI 29.0-29.9,adult - Primary    Relevant Medications    insulin pen needle (31G X 5 MM) 31G X 5 MM miscellaneous    liraglutide - Weight Management (SAXENDA) 18 MG/3ML pen         Start Saxenda, plan to switch to Wegovy when available. No hx of pancreatitis.  No hx of MEN or " "MTC  Follow up 1 month MTM  Follow up 3 months Sandhya  Follow up RD prn    She has the following co-morbidities:       4/4/2022   I have the following health issues associated with obesity: None of the above   I have the following symptoms associated with obesity: Back Pain       Patient Goals 4/4/2022   I am interested in having a healthier weight to diminish current health problems: Yes   I am interested in having a healthier weight in order to prevent future health problems: Yes   I am interested in having a healthier weight in order to have a future surgery: No       Referring Provider 4/4/2022   Please name the provider who referred you to Medical Weight Management.  If you do not know, please answer: \"I Don't Know\". no       Weight History 4/4/2022   How concerned are you about your weight? Very Concerned   Would you describe your weight gain as gradual? Yes   I became overweight: As an Adult   The following factors have contributed to my weight gain:  After Quitting Smoking, Eating Wrong Types of Food, Eating Too Much   I have tried the following methods to lose weight: Other   Please list the other methods.  [facility]   My lowest weight since age 16 was: 110   My highest weight since age 16 was: 185   The most weight I have ever lost was: (lbs) 20   I have the following family history of obesity/being overweight:  My father is overweight, One or more of my siblings are overweight, Many of my relatives are overweight   Has anyone in your family had weight loss surgery? No   How has your weight changed over the last year?  Gained   How many pounds? 10       Diet Recall Review with Patient 4/4/2022   Do you typically eat breakfast? Yes   If you do eat breakfast, what types of food do you eat? eggs, wheat toast, fruit   Do you typically eat lunch? Yes   If you do eat lunch, what types of food do you typically eat?  4oz protein, vegtables or salad water   Do you typically eat " supper? Yes   If you do eat supper, what types of food do you typically eat? 4 m0z protein,vegtables salad,potatoes   Do you typically eat snacks? Yes   If you do snack, what types of food do you typically eat? 10z protein,grain fat ,carbs   Do you like vegetables?  Yes   Do you drink water? Yes   How many glasses of juice do you drink in a typical day? 0   How many 8oz glasses of sugar containing drinks such as Duy-Aid/sweet tea do you drink in a day? 0   How many cans/bottles of sugar pop/soda/tea/sports drinks do you drink in a day? 0   How many cans/bottles of diet pop/soda/tea or sports drink do you drink in a day? 0   How often do you have a drink of alcohol? Never       Eating Habits 4/4/2022   Generally, my meals include foods like these: bread, pasta, rice, potatoes, corn, crackers, sweet dessert, pop, or juice. Once a Week   Generally, my meals include foods like these: fried meats, brats, burgers, french fries, pizza, cheese, chips, or ice cream. Once a Week   Eat fast food (like [employer], [employer], [employer]aco [employer]). Never   Eat at a buffet or sit-down restaurant. Once a Week   Eat most of my meals in front of the TV or computer. Never   Often skip meals, eat at random times, have no regular eating times. Never   Rarely sit down for a meal but snack or graze throughout.  Never   Eat extra snacks between meals. Almost Everyday   Eat most of my food at the end of the day. Never   Eat in the middle of the night or wake up at night to eat. Never   Eat extra snacks to prevent or correct low blood sugar. Never   Eat to prevent acid reflux or stomach pain. Never   Worry about not having enough food to eat. Never   Have you been to the food shelf at least a few times this year? No   I eat when I am depressed. Never   I eat when I am stressed. Less Than Weekly   I eat when I am bored. Less Than Weekly   I eat when I am anxious. Never   I eat when I am happy or as a reward. Never   I feel hungry all the time even if  I just have eaten. Never   Feeling full is important to me. Never   I finish all the food on my plate even if I am already full. Never   I can't resist eating delicious food or walk past the good food/smell. Never   I eat/snack without noticing that I am eating. Never   I eat when I am preparing the meal. Less Than Weekly   I eat more than usual when I see others eating. Never   I have trouble not eating sweets, ice cream, cookies, or chips if they are around the house. Never   I think about food all day. Never   What foods, if any, do you crave? Cheese   Please list any other foods you crave? cheese and chocolate       Amount of Food 4/4/2022   I make myself vomit what I have eaten or use laxatives to get rid of food. Never   I eat a large amount of food, like a loaf of bread, a box of cookies, a pint/quart of ice cream, all at once. Never   I eat a large amount of food even when I am not hungry. Never   I eat rapidly. Never   I eat alone because I feel embarrassed and do not want others to see how much I have eaten. Never   I eat until I am uncomfortably full. Never   I feel bad, disgusted, or guilty after I overeat. Monthly   I make myself vomit what I have eaten or use laxatives to get rid of food. Never       Activity/Exercise History 4/4/2022   How much of a typical 12 hour day do you spend sitting? Half the Day   How much of a typical 12 hour day do you spend lying down? Less Than Half the Day   How much of a typical day do you spend walking/standing? Half the Day   How many hours (not including work) do you spend on the TV/Video Games/Computer/Tablet/Phone? 4-5 Hours   How many times a week are you active for the purpose of exercise? 4-5 TImes a Week   How many total minutes do you spend doing some activity for the purpose of exercising when you exercise? More Than 30 Minutes       PAST MEDICAL HISTORY:  Past Medical History:   Diagnosis Date     Anxiety      Anxiety      Depression      Depressive disorder      chronic      HPV (human papilloma virus) anogenital infection      OAB (overactive bladder)      Ovarian cyst      Uterine polyp        Work/Social History Reviewed With Patient 4/4/2022   My employment status is: Full-Time   My job is: Sales   How much of your job is spent on the computer or phone? 50%   How many hours do you spend commuting to work daily?  30-8 hrs   What is your marital status? /In a Relationship   If in a relationship, is your significant other overweight? Yes   Do you have children? Yes   If you have children, are they overweight? No   Who do you live with?   and step children   Are they supportive of your health goals? Yes   Who does the food shopping?  self and        Mental Health History Reviewed With Patient 4/4/2022   Have you ever been physically or sexually abused? No   If yes, do you feel that the abuse is affecting your weight? No   If yes, would you like to talk to a counselor about the abuse? No   How often in the past 2 weeks have you felt little interest or pleasure in doing things? Not at all   Over the past 2 weeks how often have you felt down, depressed, or hopeless? Not at all       Sleep History Reviewed With Patient 4/4/2022   How many hours do you sleep at night? 8   Do you think that you snore loudly or has anybody ever heard you snore loudly (louder than talking or so loud it can be heard behind a shut door)? Yes   Has anyone seen or heard you stop breathing during your sleep? No   Do you often feel tired, fatigued, or sleepy during the day? Yes   Do you have a TV/Computer in your bedroom? Yes       MEDICATIONS:   Current Outpatient Medications   Medication Sig Dispense Refill     BUPROPION HCL PO Take 150 mg by mouth       FLUOXETINE HCL PO Take 40 mg by mouth       insulin pen needle (31G X 5 MM) 31G X 5 MM miscellaneous Use 1 pen needles daily or as directed. 100 each 0     liraglutide - Weight Management (SAXENDA) 18 MG/3ML pen Week 1: 0.6 mg  "subcutaneous daily, Week 2: 1.2 mg daily, Week 3: 1.8 mg daily, Week 4: 2.4 mg daily, Week 5 & on: 3 mg daily 15 mL 1     ondansetron (ZOFRAN) 4 MG tablet Take 1 tablet (4 mg) by mouth every 6 hours as needed for nausea 20 tablet 3     prochlorperazine (COMPAZINE) 5 MG tablet Take 0.5-1 tablets (2.5-5 mg) by mouth every 6 hours as needed for nausea or vomiting 20 tablet 3     propranolol (INDERAL) 20 MG tablet Take one tab at bedtime daily and may increase to one tab twice daily if tolerated 60 tablet 5     propranolol ER (INDERAL LA) 60 MG 24 hr capsule TAKE 1 CAPSULE BY MOUTH EVERY DAY 30 capsule 5     rizatriptan (MAXALT-MLT) 5 MG ODT Take 1-2 tablets (5-10 mg) by mouth at onset of headache for migraine (may repeat in 2 hours as needed. Max 30 mg in 24 hours) 18 tablet 6       ALLERGIES:   No Known Allergies                                 PHYSICAL EXAM:  Objective    Ht 1.651 m (5' 5\")   Wt 81.6 kg (180 lb)   BMI 29.95 kg/m           Physical Exam   GENERAL: Healthy, alert and no distress  EYES: Eyes grossly normal to inspection.  No discharge or erythema, or obvious scleral/conjunctival abnormalities.  RESP: No audible wheeze, cough, or visible cyanosis.  No visible retractions or increased work of breathing.    SKIN: Visible skin clear. No significant rash, abnormal pigmentation or lesions.  NEURO: Cranial nerves grossly intact.  Mentation and speech appropriate for age.  PSYCH: Mentation appears normal, affect normal/bright, judgement and insight intact, normal speech and appearance well-groomed.        Sincerely,    Sandhya Stein PA-C      "

## 2022-04-04 NOTE — PATIENT INSTRUCTIONS
MEDICATION STARTED AT THIS APPOINTMENT    We are starting a GLP-1 (Glucagon-like Peptide-1) medication called Saxenda. One of the ways it works is by slowing down the rate that food leaves your stomach. You feel doan and will eat less. It also helps regulate hormones that can help improve your blood sugars.    If you are a patient that checks blood sugars, continue to check as instructed by your doctor. Low blood sugars are rare but can happen if patients are on insulin or other oral agents. If you notice consistent low sugars or high sugars, your medication may need to be adjusted after your appointment. If this is the case, please call RN and provide her your blood sugar record from the last 3-4 days. The RN will get in touch with the doctor and call you back/[employer] message with recommendations. We tolerate high sugars for a bit, so if sugars are running 180-200, this is ok. As weight starts dropping the blood sugars should too. If readings are consistently over 200 for 1-2 weeks, then you should call the doctor/nurse.    Dosing for this medication:   Week 1- Inject 0.6 mg daily  Week 2- Inject 1.2 mg daily  Week 3- Inject 1.8 mg daily  Week 4- Inject 2.4 mg daily  Week 5 and thereafter- Inject 3.0 mg daily    Side effects of GLP- Medications include: The most common side effects are all GI related and consist of: nausea, constipation, diarrhea, burping, or gassiness. Patients are advised to eat slowly and less, and nausea typically passes if people can stick it out.     The risk of pancreatitis (inflammation of the pancreas) has been associated with this type of medication, but is very rare.  If you have had pancreatitis in the past, this medication may not be for you. Please let us know about any past history of pancreas problems.    Symptoms of pancreatitis include: Pain in your upper stomach area which may travel to your back and be worse after eating. Your stomach area may be tender to the touch.  You  may have vomiting or nausea and/or have a fever. If you should develop any of these symptoms, stop the medication and contact your primary care doctor. They will do a blood test to check for pancreatitis.         There is a small chance you may have some low blood sugar after taking the medication.   The signs of low blood sugar are:  o Weakness  o Shaky   o Hungry  o Sweating  o Confusion      See below for ways to treat low blood sugar without adding in lots of extra calories.      Treating Low Blood Sugar    If you have symptoms of low blood sugar (sweating, shaking, dizzy, confused) eat 15 grams of carbs and wait 15 minutes:      Glucose Tabs are best for sugars under 70 -  Dex4 or BD Glucose tablets are good, you will need to take 3-4 of these to equal 15 grams.       One small box of raisins    4 oz fruit juice box or   cup fruit juice    1 small apple    1 small banana      cup canned fruit in water      English muffin or a slice of bread with jelly     1 low fat frozen waffle with sugar-free syrup      cup cottage cheese with   cup frozen or fresh blueberries    1 cup skim or low-fat milk      cup whole grain cereal    4-6 crackers such as Triscuits      This medication is usually not covered by insurance and can be quite expensive. Sometimes a prior authorization is required, which may take up to 1-2 weeks for an insurance company to make a decision if they will cover the medication. Please be patient, you will be notified after a decision has been made.    Contact the nurse via [employer] or call [phone] if you have any questions or concerns. (Do not stop taking it if you don't think it's working. For some people it works without them knowing it.)     In order to get refills of this or any medication we prescribe you must be seen in the medical weight mgmt clinic every 2-4 months.

## 2022-04-04 NOTE — LETTER
"2022       RE: Marisel Jarrett  [address] [place] [address]     Dear Colleague,    Thank you for referring your patient, Marisel Jarrett, to the [facility] [place] at [facility]. Please see a copy of my visit note below.    Marisel is a 46 year old who is being evaluated via a billable video visit.      How would you like to obtain your AVS? MyChart  If the video visit is dropped, the invitation should be resent by: Text to cell phone: [phone]  Will anyone else be joining your video visit? No    Video Start Time: 100    Video-Visit Details    Type of service:  Video Visit    Video End Time:1:28 PM    Originating Location (pt. Location): Home    Distant Location (provider location):  [facility] [place]     Platform used for Video Visit: [employer]     30 minutes spent on the date of the encounter doing chart review, history and exam, documentation and further activities per the note    New Medical Weight Management Consult    PATIENT:  Marisel Jarrett  MRN:         [id number]  :         1975  SUSAN:         2022    Dear Dr Bebe Palmer,    I had the pleasure of seeing your patient, Marisel Jarrett. Full intake/assessment was done to determine barriers to weight loss success and develop a treatment plan. Marisel Jarrett is a 46 year old female interested in treatment of medical problems associated with excess weight. She has a height of 5' 5\", a weight of 180 lbs 0 oz, and the calculated Body mass index is 29.95 kg/m .     Working with Nutritional Weight and Wellness for the last 3 months. She has lost a few pounds, clothes fitting better and feels better but not losing more.   [place] then moved to [place] and less active and gained weight over the last 8 years from 130-185 lbs  Early menopause late 's      Assessment & " "Plan   Problem List Items Addressed This Visit     BMI 29.0-29.9,adult - Primary    Relevant Medications    insulin pen needle (31G X 5 MM) 31G X 5 MM miscellaneous    liraglutide - Weight Management (SAXENDA) 18 MG/3ML pen         Start Saxenda, plan to switch to Wegovy when available. No hx of pancreatitis.  No hx of MEN or MTC  Follow up 1 month MTM  Follow up 3 months Sandhya  Follow up RD prn    She has the following co-morbidities:       4/4/2022   I have the following health issues associated with obesity: None of the above   I have the following symptoms associated with obesity: Back Pain       Patient Goals 4/4/2022   I am interested in having a healthier weight to diminish current health problems: Yes   I am interested in having a healthier weight in order to prevent future health problems: Yes   I am interested in having a healthier weight in order to have a future surgery: No       Referring Provider 4/4/2022   Please name the provider who referred you to Medical Weight Management.  If you do not know, please answer: \"I Don't Know\". no       Weight History 4/4/2022   How concerned are you about your weight? Very Concerned   Would you describe your weight gain as gradual? Yes   I became overweight: As an Adult   The following factors have contributed to my weight gain:  After Quitting Smoking, Eating Wrong Types of Food, Eating Too Much   I have tried the following methods to lose weight: Other   Please list the other methods.  [facility]   My lowest weight since age 16 was: 110   My highest weight since age 16 was: 185   The most weight I have ever lost was: (lbs) 20   I have the following family history of obesity/being overweight:  My father is overweight, One or more of my siblings are overweight, Many of my relatives are overweight   Has anyone in your family had weight loss surgery? No   How has your weight changed over the last year?  Gained   How many pounds? 10 "       Diet Recall Review with Patient 4/4/2022   Do you typically eat breakfast? Yes   If you do eat breakfast, what types of food do you eat? eggs, wheat toast, fruit   Do you typically eat lunch? Yes   If you do eat lunch, what types of food do you typically eat?  4oz protein, vegtables or salad water   Do you typically eat supper? Yes   If you do eat supper, what types of food do you typically eat? 4 m0z protein,vegtables salad,potatoes   Do you typically eat snacks? Yes   If you do snack, what types of food do you typically eat? 10z protein,grain fat ,carbs   Do you like vegetables?  Yes   Do you drink water? Yes   How many glasses of juice do you drink in a typical day? 0   How many 8oz glasses of sugar containing drinks such as Duy-Aid/sweet tea do you drink in a day? 0   How many cans/bottles of sugar pop/soda/tea/sports drinks do you drink in a day? 0   How many cans/bottles of diet pop/soda/tea or sports drink do you drink in a day? 0   How often do you have a drink of alcohol? Never       Eating Habits 4/4/2022   Generally, my meals include foods like these: bread, pasta, rice, potatoes, corn, crackers, sweet dessert, pop, or juice. Once a Week   Generally, my meals include foods like these: fried meats, brats, burgers, french fries, pizza, cheese, chips, or ice cream. Once a Week   Eat fast food (like [employer], [employer], [employer]aco Bell). Never   Eat at a buffet or sit-down restaurant. Once a Week   Eat most of my meals in front of the TV or computer. Never   Often skip meals, eat at random times, have no regular eating times. Never   Rarely sit down for a meal but snack or graze throughout.  Never   Eat extra snacks between meals. Almost Everyday   Eat most of my food at the end of the day. Never   Eat in the middle of the night or wake up at night to eat. Never   Eat extra snacks to prevent or correct low blood sugar. Never   Eat to prevent acid reflux or stomach pain. Never   Worry about not having  enough food to eat. Never   Have you been to the food shelf at least a few times this year? No   I eat when I am depressed. Never   I eat when I am stressed. Less Than Weekly   I eat when I am bored. Less Than Weekly   I eat when I am anxious. Never   I eat when I am happy or as a reward. Never   I feel hungry all the time even if I just have eaten. Never   Feeling full is important to me. Never   I finish all the food on my plate even if I am already full. Never   I can't resist eating delicious food or walk past the good food/smell. Never   I eat/snack without noticing that I am eating. Never   I eat when I am preparing the meal. Less Than Weekly   I eat more than usual when I see others eating. Never   I have trouble not eating sweets, ice cream, cookies, or chips if they are around the house. Never   I think about food all day. Never   What foods, if any, do you crave? Cheese   Please list any other foods you crave? cheese and chocolate       Amount of Food 4/4/2022   I make myself vomit what I have eaten or use laxatives to get rid of food. Never   I eat a large amount of food, like a loaf of bread, a box of cookies, a pint/quart of ice cream, all at once. Never   I eat a large amount of food even when I am not hungry. Never   I eat rapidly. Never   I eat alone because I feel embarrassed and do not want others to see how much I have eaten. Never   I eat until I am uncomfortably full. Never   I feel bad, disgusted, or guilty after I overeat. Monthly   I make myself vomit what I have eaten or use laxatives to get rid of food. Never       Activity/Exercise History 4/4/2022   How much of a typical 12 hour day do you spend sitting? Half the Day   How much of a typical 12 hour day do you spend lying down? Less Than Half the Day   How much of a typical day do you spend walking/standing? Half the Day   How many hours (not including work) do you spend on the TV/Video Games/Computer/Tablet/Phone? 4-5 Hours   How many  times a week are you active for the purpose of exercise? 4-5 TImes a Week   How many total minutes do you spend doing some activity for the purpose of exercising when you exercise? More Than 30 Minutes       PAST MEDICAL HISTORY:  Past Medical History:   Diagnosis Date     Anxiety      Anxiety      Depression      Depressive disorder     chronic      HPV (human papilloma virus) anogenital infection      OAB (overactive bladder)      Ovarian cyst      Uterine polyp        Work/Social History Reviewed With Patient 4/4/2022   My employment status is: Full-Time   My job is: Sales   How much of your job is spent on the computer or phone? 50%   How many hours do you spend commuting to work daily?  30-8 hrs   What is your marital status? /In a Relationship   If in a relationship, is your significant other overweight? Yes   Do you have children? Yes   If you have children, are they overweight? No   Who do you live with?   and step children   Are they supportive of your health goals? Yes   Who does the food shopping?  self and        Mental Health History Reviewed With Patient 4/4/2022   Have you ever been physically or sexually abused? No   If yes, do you feel that the abuse is affecting your weight? No   If yes, would you like to talk to a counselor about the abuse? No   How often in the past 2 weeks have you felt little interest or pleasure in doing things? Not at all   Over the past 2 weeks how often have you felt down, depressed, or hopeless? Not at all       Sleep History Reviewed With Patient 4/4/2022   How many hours do you sleep at night? 8   Do you think that you snore loudly or has anybody ever heard you snore loudly (louder than talking or so loud it can be heard behind a shut door)? Yes   Has anyone seen or heard you stop breathing during your sleep? No   Do you often feel tired, fatigued, or sleepy during the day? Yes   Do you have a TV/Computer in your bedroom? Yes       MEDICATIONS:  "  Current Outpatient Medications   Medication Sig Dispense Refill     BUPROPION HCL PO Take 150 mg by mouth       FLUOXETINE HCL PO Take 40 mg by mouth       insulin pen needle (31G X 5 MM) 31G X 5 MM miscellaneous Use 1 pen needles daily or as directed. 100 each 0     liraglutide - Weight Management (SAXENDA) 18 MG/3ML pen Week 1: 0.6 mg subcutaneous daily, Week 2: 1.2 mg daily, Week 3: 1.8 mg daily, Week 4: 2.4 mg daily, Week 5 & on: 3 mg daily 15 mL 1     ondansetron (ZOFRAN) 4 MG tablet Take 1 tablet (4 mg) by mouth every 6 hours as needed for nausea 20 tablet 3     prochlorperazine (COMPAZINE) 5 MG tablet Take 0.5-1 tablets (2.5-5 mg) by mouth every 6 hours as needed for nausea or vomiting 20 tablet 3     propranolol (INDERAL) 20 MG tablet Take one tab at bedtime daily and may increase to one tab twice daily if tolerated 60 tablet 5     propranolol ER (INDERAL LA) 60 MG 24 hr capsule TAKE 1 CAPSULE BY MOUTH EVERY DAY 30 capsule 5     rizatriptan (MAXALT-MLT) 5 MG ODT Take 1-2 tablets (5-10 mg) by mouth at onset of headache for migraine (may repeat in 2 hours as needed. Max 30 mg in 24 hours) 18 tablet 6       ALLERGIES:   No Known Allergies                                 PHYSICAL EXAM:  Objective    Ht 1.651 m (5' 5\")   Wt 81.6 kg (180 lb)   BMI 29.95 kg/m       Physical Exam   GENERAL: Healthy, alert and no distress  EYES: Eyes grossly normal to inspection.  No discharge or erythema, or obvious scleral/conjunctival abnormalities.  RESP: No audible wheeze, cough, or visible cyanosis.  No visible retractions or increased work of breathing.    SKIN: Visible skin clear. No significant rash, abnormal pigmentation or lesions.  NEURO: Cranial nerves grossly intact.  Mentation and speech appropriate for age.  PSYCH: Mentation appears normal, affect normal/bright, judgement and insight intact, normal speech and appearance well-groomed.        Sincerely,    Sandhya Stein PA-C  "

## 2022-04-04 NOTE — TELEPHONE ENCOUNTER
"Prior Authorization Retail Medication Request    Medication/Dose: Saxenda  ICD code (if different than what is on RX): obesity E66.9    Previously Tried and Failed:  History of diet and exercise, nutritional weight and wellness, [place]  Rationale:  I had the pleasure of seeing your patient, Marisel Jarrett. Full intake/assessment was done to determine barriers to weight loss success and develop a treatment plan. Marisel Jarrett is a 46 year old female interested in treatment of medical problems associated with excess weight. She has a height of 5' 5\", a weight of 180 lbs 0 oz, and the calculated Body mass index is 29.95 kg/m . Working with Nutritional Weight and Wellness for the last 3 months. She has lost a few pounds, clothes fitting better and feels better but not losing more. Lived in [facility] and more active then moved to [place] and less active and gained weight over the last 8 years from 130-185 lbs.  Early menopause late 30's. Starting with Saxenda with plan to switch to Wegovy in the future.     Insurance Name:    Insurance ID:        Pharmacy Information (if different than what is on RX)  Name:  [id number] - [place], [place] - [address]  Phone:  [phone]  "

## 2022-04-06 ENCOUNTER — VIRTUAL VISIT (OUTPATIENT)
Dept: NEUROLOGY | Facility: CLINIC | Age: 47
End: 2022-04-06
Payer: COMMERCIAL

## 2022-04-06 DIAGNOSIS — G43.101 MIGRAINE WITH AURA AND WITH STATUS MIGRAINOSUS, NOT INTRACTABLE: Primary | ICD-10-CM

## 2022-04-06 PROCEDURE — 99212 OFFICE O/P EST SF 10 MIN: CPT | Mod: 95 | Performed by: NURSE PRACTITIONER

## 2022-04-06 ASSESSMENT — HEADACHE IMPACT TEST (HIT 6)
WHEN YOU HAVE HEADACHES HOW OFTEN IS THE PAIN SEVERE: SOMETIMES
HOW OFTEN HAVE YOU FELT FED UP OR IRRITATED BECAUSE OF YOUR HEADACHES: NEVER
WHEN YOU HAVE HEADACHES HOW OFTEN IS THE PAIN SEVERE: SOMETIMES
HOW OFTEN DO HEADACHES LIMIT YOUR DAILY ACTIVITIES: RARELY
HOW OFTEN DID HEADACHS LIMIT CONCENTRATION ON WORK OR DAILY ACTIVITY: NEVER
HIT6 TOTAL SCORE: 47
HOW OFTEN DO HEADACHES LIMIT YOUR DAILY ACTIVITIES: RARELY
HOW OFTEN HAVE YOU FELT TOO TIRED TO WORK BECAUSE OF YOUR HEADACHES: NEVER
WHEN YOU HAVE A HEADACHE HOW OFTEN DO YOU WISH YOU COULD LIE DOWN: VERY OFTEN
HIT6 TOTAL SCORE: 47
WHEN YOU HAVE A HEADACHE HOW OFTEN DO YOU WISH YOU COULD LIE DOWN: VERY OFTEN
HOW OFTEN DID HEADACHS LIMIT CONCENTRATION ON WORK OR DAILY ACTIVITY: NEVER
HOW OFTEN HAVE YOU FELT FED UP OR IRRITATED BECAUSE OF YOUR HEADACHES: NEVER
HOW OFTEN HAVE YOU FELT TOO TIRED TO WORK BECAUSE OF YOUR HEADACHES: NEVER

## 2022-04-06 NOTE — PROGRESS NOTES
Marisel Jarrett  is being evaluated via a billable video visit.      How would you like to obtain your AVS? [employer]  For the video visit, send the invitation by: Text to cell phone: [phone]  Will anyone else be joining your video visit? No      As part of ongoing efforts to provide the best care for our patients with Migraines, we are asking patients to complete a MIDAS questionnaire recheck every 6 months if stable and more often if symptoms are not ideally controlled.     I would like you to make sure that your healthcare is optimized and my records indicated that completion of the  MIDAS is due at this time.    In order to facilitate your care, please complete the survey below and include the questions and answers in your response to me.  Thank you.    MATTI Gavin CNP     The Migraine Disability Assessment Test    The MIDAS (Migraine Disability Assessment) questionnaire was put together to help you measure the impact your headaches have on your life. The information on this questionnaire is also helpful for your primary care provider to determine the level of pain and disability caused by your headaches and to find the best treatment for you.    INSTRUCTIONS  Please answer the following questions about ALL of the headaches you have had over the last 3 months. Select your answer in the box next to each question. Select zero if you did not have the activity in the last  3 months.    1. On how many days in the last 3 months did you miss work or school because of your headaches?  0      2. How many days in the last 3 months was your productivity at work orschool reduced by half or more because of your headaches? (Do not include days you counted in question 1 where you missed work or school.) 0    3. On how many days in the last 3 months did you not do household work (such as housework, home repairs and maintenance, shopping, caring for children and relatives) because of your  headaches? 0    4. How many days in the last 3 months was your productivity in householdwork reduced by half of more because of your headaches? (Do not include days you counted in question 3 where you did not do householdwork.) 0    5. On how many days in the last 3 months did you miss family, social or leisure activities because of your headaches? 0    Total (Questions 1-5): 0       A. On how many days in the last 3 months did you have a headache? (If a headache lasted more than 1 day, count each day.) 0    B. On a scale of 0 - 10, on average how painful were these headaches? (where 0 = no pain at all, and 10 = pain as bad as it can be.) sometimes a 7 or 8, dont really get there anymore when taking medications.     Headache follow up note:  Established patient in our Headache Clinic. Last visit 1/11/2022, see note for details  Propranolol has been working well and no side effects. About 1-2 headaches per month that she needs treatment.   No other concerns.   No changes in headache treatment.     Start 11:48 am  End 11:52 am    5 minutes spent on the date of the encounter doing video access,      MATTI Hudson, CNP [facility]  Headache certified  [facility]

## 2022-04-06 NOTE — LETTER
4/6/2022       RE: Marisel Jarrett  [address] [place] [address]     Dear Colleague,    Thank you for referring your patient, Marisel Jarrett, to the [facility] NEUROLOGY [facility]IN[facility] [place] at [facility]. Please see a copy of my visit note below.      As part of ongoing efforts to provide the best care for our patients with Migraines, we are asking patients to complete a MIDAS questionnaire recheck every 6 months if stable and more often if symptoms are not ideally controlled.     I would like you to make sure that your healthcare is optimized and my records indicated that completion of the  MIDAS is due at this time.    In order to facilitate your care, please complete the survey below and include the questions and answers in your response to me.  Thank you.    MATTI Gavin CNP     The Migraine Disability Assessment Test    The MIDAS (Migraine Disability Assessment) questionnaire was put together to help you measure the impact your headaches have on your life. The information on this questionnaire is also helpful for your primary care provider to determine the level of pain and disability caused by your headaches and to find the best treatment for you.    INSTRUCTIONS  Please answer the following questions about ALL of the headaches you have had over the last 3 months. Select your answer in the box next to each question. Select zero if you did not have the activity in the last  3 months.    1. On how many days in the last 3 months did you miss work or school because of your headaches?  0      2. How many days in the last 3 months was your productivity at work orschool reduced by half or more because of your headaches? (Do not include days you counted in question 1 where you missed work or school.) 0    3. On how many days in the last 3 months did you not do household work (such as housework, home repairs and  maintenance, shopping, caring for children and relatives) because of your headaches? 0    4. How many days in the last 3 months was your productivity in householdwork reduced by half of more because of your headaches? (Do not include days you counted in question 3 where you did not do householdwork.) 0    5. On how many days in the last 3 months did you miss family, social or leisure activities because of your headaches? 0    Total (Questions 1-5): 0       A. On how many days in the last 3 months did you have a headache? (If a headache lasted more than 1 day, count each day.) 0    B. On a scale of 0 - 10, on average how painful were these headaches? (where 0 = no pain at all, and 10 = pain as bad as it can be.) sometimes a 7 or 8, dont really get there anymore when taking medications.     Headache follow up note:  Established patient in our Headache Clinic. Last visit 1/11/2022, see note for details  Propranolol has been working well and no side effects. About 1-2 headaches per month that she needs treatment.   No other concerns.   No changes in headache treatment.     Start 11:48 am  End 11:52 am    5 minutes spent on the date of the encounter doing video access,      MATTI Hudson, CNP [facility]  Headache certified  [facility]

## 2022-04-08 ENCOUNTER — TELEPHONE (OUTPATIENT)
Dept: ENDOCRINOLOGY | Facility: CLINIC | Age: 47
End: 2022-04-08
Payer: COMMERCIAL

## 2022-04-08 NOTE — TELEPHONE ENCOUNTER
Central Prior Authorization Team   Phone: [phone]      PA Initiation    Medication: Saxenda-PA iniitated  Insurance Company: [employer] [place] - Phone [phone] Fax [phone]  Pharmacy Filling the Rx: [id number] - [place], [place] - [address]  Filling Pharmacy Phone: [phone]  Filling Pharmacy Fax:    Start Date: 4/8/2022

## 2022-04-11 NOTE — TELEPHONE ENCOUNTER
Prior Authorization Approval    Authorization Effective Date: 4/11/2022  Authorization Expiration Date: 8/11/2022  Medication: Saxenda-PA approved  Approved Dose/Quantity:   Reference #:     Insurance Company: [employer] [place] - Phone [phone] Fax [phone]  Expected CoPay:       CoPay Card Available:      Foundation Assistance Needed:    Which Pharmacy is filling the prescription (Not needed for infusion/clinic administered): [id number] - [place], [place] - [address] [place] [address]  Pharmacy Notified: Yes  Patient Notified: No

## 2022-05-16 ENCOUNTER — HEALTH MAINTENANCE LETTER (OUTPATIENT)
Age: 47
End: 2022-05-16

## 2022-05-23 ENCOUNTER — VIRTUAL VISIT (OUTPATIENT)
Dept: FAMILY MEDICINE | Facility: CLINIC | Age: 47
End: 2022-05-23
Payer: COMMERCIAL

## 2022-05-23 DIAGNOSIS — L50.8 VIRAL URTICARIA: ICD-10-CM

## 2022-05-23 DIAGNOSIS — Z12.11 SCREEN FOR COLON CANCER: ICD-10-CM

## 2022-05-23 DIAGNOSIS — F33.42 RECURRENT MAJOR DEPRESSIVE DISORDER, IN FULL REMISSION (H): ICD-10-CM

## 2022-05-23 DIAGNOSIS — F41.1 GENERALIZED ANXIETY DISORDER: Primary | ICD-10-CM

## 2022-05-23 PROCEDURE — 99213 OFFICE O/P EST LOW 20 MIN: CPT | Mod: 95 | Performed by: FAMILY MEDICINE

## 2022-05-23 RX ORDER — HYDROXYZINE PAMOATE 50 MG/1
50 CAPSULE ORAL
Qty: 30 CAPSULE | Refills: 0 | Status: SHIPPED | OUTPATIENT
Start: 2022-05-23 | End: 2022-06-08

## 2022-05-23 RX ORDER — FLUOXETINE 40 MG/1
CAPSULE ORAL
Qty: 90 CAPSULE | Refills: 3 | Status: SHIPPED | OUTPATIENT
Start: 2022-05-23

## 2022-05-23 ASSESSMENT — PATIENT HEALTH QUESTIONNAIRE - PHQ9: SUM OF ALL RESPONSES TO PHQ QUESTIONS 1-9: 0

## 2022-05-23 NOTE — PROGRESS NOTES
Marisel is a 46 year old who is being evaluated via a billable video visit.      How would you like to obtain your AVS? MyChart  If the video visit is dropped, the invitation should be resent by: Text to cell phone: [phone]  Will anyone else be joining your video visit? No      Video Start Time: 9:11 AM    Marisel was seen today for medication update, follow up and depression.    Diagnoses and all orders for this visit:    Generalized anxiety disorder  -     FLUoxetine (PROZAC) 40 MG capsule; TAKE 1 CAPSULE BY MOUTH EVERY DAY  Currently well controlled on her dose of fluoxetine.  She would like to continue for now.    Major depressive disorder, recurrent, unspecified (H)    Screen for colon cancer  -     Adult Gastro Ref - Procedure Only; Future    Viral urticaria  -     hydrOXYzine (VISTARIL) 50 MG capsule; Take 1 capsule (50 mg) by mouth nightly as needed for itching  Will start on hydroxyzine to help with itching after her viral rash.  This is from COVID.  We did discuss the self-limited nature of this, however it can be migrating and ongoing for the next couple of weeks.    Other orders  -     REVIEW OF HEALTH MAINTENANCE PROTOCOL ORDERS          Subjective   Marisel is a 46 year old who presents for the following health issues     HPI   Follow up of anxiety and depression:  Since her last visit, she had stopped the bupropion which is helped a lot with some of the feelings of anxiety.  She continues taking the fluoxetine 40 mg daily.    As an update to the her stressors: She and her  have moved on from attempting pregnancy.  The biological mother of her stepchildren has lost full custody and is now doing online therapy.  Her youngest did end up getting COVID and was in the ER.  She notes her oldest stepdaughter is now in college.  She will be out of the home starting in August.  She will be going to [employer] in [place].  The greatest difficulty there is helping her with her eating disorder  and the fact that her daughter identifies as disabled due to the knee pain, but her knees have improved.      Patient herself had COVID.  Finished quarantine on Friday.  Symptoms cleared up on Wednesday/Thursday.  She did take Paxlovid.  Unfortunately she developed a viral urticaria that appears on her stomach and arms and legs.  It is migrating.  She is quite itchy.  She is already taking antihistamine twice a day.      Objective           Vitals:  No vitals were obtained today due to virtual visit.    Physical Exam   GENERAL: Healthy, alert and no distress  EYES: Eyes grossly normal to inspection.  No discharge or erythema, or obvious scleral/conjunctival abnormalities.  RESP: No audible wheeze, cough, or visible cyanosis.  No visible retractions or increased work of breathing.    PSYCH: Mentation appears normal, affect normal/bright, judgement and insight intact, normal speech and appearance well-groomed.    Psych Exam:  Behavior:normal    Mood:pleaseant, upbeat   Affect:normal   Eye Contact:normal   Thought Content: normal   Insight:normal    Judgement: normal        PATIENT HEALTH QUESTIONNAIRE-9 (PHQ - 9)    Over the last 2 weeks, how often have you been bothered by any of the following problems?    1. Little interest or pleasure in doing things -  Not at all   2. Feeling down, depressed, or hopeless -  Not at all   3. Trouble falling or staying asleep, or sleeping too much - Not at all   4. Feeling tired or having little energy -  Not at all   5. Poor appetite or overeating -  Not at all   6. Feeling bad about yourself - or that you are a failure or have let yourself or your family down -  Not at all   7. Trouble concentrating on things, such as reading the newspaper or watching television - Not at all   8. Moving or speaking so slowly that other people could have noticed? Or the opposite - being so fidgety or restless that you have been moving around a lot more than usual Not at all   9. Thoughts that you  would be better off dead or of hurting  yourself in some way Not at all   Total Score: 0     If you checked off any problems, how difficult have these problems made it for you to do your work, take care of things at home, or get along with other people? Not difficult at all    Developed by Toro Sy, Latonia Portillo, Bubba Arroyo and colleagues, with an educational marin from [employer] No permission required to reproduce, translate, display or distribute. permission required to reproduce, translate, display or distribute.               Video-Visit Details    Type of service:  Video Visit    Video End Time:9:28 AM    Originating Location (pt. Location): Other work    Distant Location (provider location):  [facility]     Platform used for Video Visit: Neurolink

## 2022-06-08 DIAGNOSIS — L50.8 VIRAL URTICARIA: ICD-10-CM

## 2022-06-08 RX ORDER — LIRAGLUTIDE 6 MG/ML
3 INJECTION, SOLUTION SUBCUTANEOUS DAILY
Qty: 15 ML | Refills: 1 | Status: SHIPPED | OUTPATIENT
Start: 2022-06-08 | End: 2022-08-23

## 2022-06-08 NOTE — TELEPHONE ENCOUNTER
Last Clinic Visit: 4/4/2022  [facility] [place]  Recommended 3 month follow up  NV 8/18/22  Most recent creatinine care everywhere 5/11/22  CREATININE, POCT 0.57 - 1.11 mg/dL 0.70

## 2022-06-09 RX ORDER — HYDROXYZINE PAMOATE 50 MG/1
50 CAPSULE ORAL
Qty: 90 CAPSULE | Refills: 3 | Status: SHIPPED | OUTPATIENT
Start: 2022-06-09 | End: 2024-05-09

## 2022-06-14 RX ORDER — FLURBIPROFEN SODIUM 0.3 MG/ML
SOLUTION/ DROPS OPHTHALMIC
Qty: 100 EACH | Refills: 0 | Status: SHIPPED | OUTPATIENT
Start: 2022-06-14 | End: 2024-03-27

## 2022-06-14 NOTE — TELEPHONE ENCOUNTER
Pen needle    4/4/2022  [facility] Sandhya Lee PA-C    Surgery    Nv:  8/18/22

## 2022-08-16 NOTE — PROGRESS NOTES
[place] [id number] [address]  Phone: [phone]  Fax: [phone]  Primary Provider: Bebe Palmer  Pre-op Performing Provider: MATTI SAMUELS      PREOPERATIVE EVALUATION:  Today's date: 8/17/2022    Marisel Jarrett is a 46 year old female who presents for a preoperative evaluation.    Surgical Information:  Surgery/Procedure: LEEP  Surgery Location: [place], [place]  Surgeon: Shu  Surgery Date: Thursday, 8/18/2022  Time of Surgery: 1:00pm  Where patient plans to recover: At home with family  Fax number for surgical facility:    Type of Anesthesia Anticipated: sedation    Assessment & Plan     The proposed surgical procedure is considered LOW risk.    Problem List Items Addressed This Visit        Other    BMI 29.0-29.9,adult      Other Visit Diagnoses     Preoperative examination    -  Primary    HPV in female        MALLIKA (generalized anxiety disorder)        Intractable migraine with aura with status migrainosus        Recurrent major depressive disorder, remission status unspecified (H)        Generalized anxiety disorder                   Risks and Recommendations:  The patient has the following additional risks and recommendations for perioperative complications:   - No identified additional risk factors other than previously addressed    Medication Instructions:  Patient will hold all meds morning of procedure     RECOMMENDATION:  APPROVAL GIVEN to proceed with proposed procedure, without further diagnostic evaluation.    Review of external notes as documented above               Subjective     HPI related to upcoming procedure: Patient has dx of HPV and will be undergoing LEEP procedure for treatment.        Preop Questions 8/17/2022   1. Have you ever had a heart attack or stroke? No   2. Have you ever had surgery on your heart or blood vessels, such as a stent placement, a coronary artery bypass, or surgery on  an artery in your head, neck, heart, or legs? No   3. Do you have chest pain with activity? No   4. Do you have a history of  heart failure? No   5. Do you currently have a cold, bronchitis or symptoms of other infection? No   6. Do you have a cough, shortness of breath, or wheezing? No   7. Do you or anyone in your family have previous history of blood clots? No   8. Do you or does anyone in your family have a serious bleeding problem such as prolonged bleeding following surgeries or cuts? No   9. Have you ever had problems with anemia or been told to take iron pills? No   10. Have you had any abnormal blood loss such as black, tarry or bloody stools, or abnormal vaginal bleeding? No   11. Have you ever had a blood transfusion? No   12. Are you willing to have a blood transfusion if it is medically needed before, during, or after your surgery? Yes   13. Have you or any of your relatives ever had problems with anesthesia? No   14. Do you have sleep apnea, excessive snoring or daytime drowsiness? No   15. Do you have any artifical heart valves or other implanted medical devices like a pacemaker, defibrillator, or continuous glucose monitor? No   16. Do you have artificial joints? No   17. Are you allergic to latex? No   18. Is there any chance that you may be pregnant? No     Health Care Directive:  Patient does not have a Health Care Directive or Living Will: Discussed advance care planning with patient; however, patient declined at this time.    Preoperative Review of :   reviewed - no record of controlled substances prescribed.      Status of Chronic Conditions:  See problem list for active medical problems.  Problems all longstanding and stable, except as noted/documented.  See ROS for pertinent symptoms related to these conditions.      Review of Systems  Unremarkable other than listed above and below       Patient Active Problem List    Diagnosis Date Noted     BMI 29.0-29.9,adult 04/06/2022     Priority:  Medium     Endometrial polyp 08/16/2016     Priority: Medium     Post-menopausal bleeding 08/16/2016     Priority: Medium      Past Medical History:   Diagnosis Date     Anxiety      Anxiety      Depression      Depressive disorder     chronic      HPV (human papilloma virus) anogenital infection      OAB (overactive bladder)      Ovarian cyst      Uterine polyp      Past Surgical History:   Procedure Laterality Date     APPENDECTOMY       DILATION AND CURETTAGE N/A 01/25/2021    Procedure: DILATION AND CURETTAGE, UTERUS, USING SUCTION;  Surgeon: Tara Ireland MD;  Location: [facility];  Service: Gynecology     DILATION AND CURETTAGE, OPERATIVE HYSTEROSCOPY WITH MORCELLATOR, COMBINED N/A 08/18/2016    Procedure: COMBINED DILATION AND CURETTAGE, OPERATIVE HYSTEROSCOPY WITH MORCELLATOR;  Surgeon: Earl Medina MD;  Location: [facility]     GYN SURGERY      oopherectomy, right     HC REMOVAL OF OVARIAN CYST(S)      Description: Ovarian Cystectomy;  Recorded: 08/15/2013;  Comments: Right, 2013 - benign     MYRINGOTOMY BILATERAL       UTERINE FIBROID SURGERY  08/01/2016    at [facility]     WISDOM TOOTH EXTRACTION       ZZC APPENDECTOMY      Description: Appendectomy;  Recorded: 11/10/2012;  Comments: Sept 2012     Current Outpatient Medications   Medication Sig Dispense Refill     FLUoxetine (PROZAC) 40 MG capsule TAKE 1 CAPSULE BY MOUTH EVERY DAY 90 capsule 3     hydrOXYzine (VISTARIL) 50 MG capsule Take 1 capsule (50 mg) by mouth nightly as needed for itching 90 capsule 3     insulin pen needle (B-D U/F) 31G X 5 MM miscellaneous USE 1 PEN NEEDLES DAILY OR AS DIRECTED. 100 each 0     liraglutide - Weight Management (SAXENDA) 18 MG/3ML pen Inject 3 mg Subcutaneous daily 15 mL 1     propranolol ER (INDERAL LA) 60 MG 24 hr capsule TAKE 1 CAPSULE BY MOUTH EVERY DAY 30 capsule 5     rizatriptan (MAXALT-MLT) 5 MG ODT Take 1-2 tablets (5-10 mg) by mouth at onset of headache for migraine (may repeat in 2  "hours as needed. Max 30 mg in 24 hours) 18 tablet 6       No Known Allergies     Social History     Tobacco Use     Smoking status: Former Smoker     Start date: 1993     Quit date: 2010     Years since quittin.2     Smokeless tobacco: Former User   Substance Use Topics     Alcohol use: No     Alcohol/week: 0.0 standard drinks     Family History   Problem Relation Age of Onset     Lung Cancer Mother      Thyroid Disease Mother      Mental Illness Mother         Bipolar disorder     Cancer Father         mesothelioma     No Known Problems Sister      Hypertension Sister         smoker     Melanoma Sister 43.00     No Known Problems Sister      No Known Problems Sister      Breast Cancer No family hx of      Colon Cancer No family hx of      Clotting Disorder No family hx of      History   Drug Use No         Objective     /72 (BP Location: Right arm, Patient Position: Sitting, Cuff Size: Adult Regular)   Pulse 71   Temp 98.6  F (37  C) (Oral)   Resp 16   Ht 5' 5\" (1.651 m)   Wt 175 lb 4.8 oz (79.5 kg)   SpO2 95%   BMI 29.17 kg/m      Physical Exam  GENERAL APPEARANCE: healthy, alert and no distress  HENT: head is atraumatic normocephalic, Eyes: lids sclera and conjunctiva WNL. ear canals, mouth without ulcers or lesions  RESP: lungs clear to auscultation - no rales, rhonchi or wheezes  CV: regular rate and rhythm, normal S1 S2  ABDOMEN: soft, nontender  NEURO: mentation intact and speech normal    No results for input(s): HGB, PLT, INR, NA, POTASSIUM, CR, A1C in the last 14863 hours.     Diagnostics:  No labs were ordered during this visit.   No EKG required for low risk surgery (cataract, skin procedure, breast biopsy, etc).    Revised Cardiac Risk Index (RCRI):  The patient has the following serious cardiovascular risks for perioperative complications:   - No serious cardiac risks = 0 points     RCRI Interpretation: 0 points: Class I (very low risk - 0.4% complication rate)       "     Signed Electronically by: Marilee Charles NP  Copy of this evaluation report is provided to requesting physician.

## 2022-08-17 ENCOUNTER — OFFICE VISIT (OUTPATIENT)
Dept: INTERNAL MEDICINE | Facility: CLINIC | Age: 47
End: 2022-08-17
Payer: COMMERCIAL

## 2022-08-17 VITALS
TEMPERATURE: 98.6 F | DIASTOLIC BLOOD PRESSURE: 72 MMHG | OXYGEN SATURATION: 95 % | HEART RATE: 71 BPM | WEIGHT: 175.3 LBS | SYSTOLIC BLOOD PRESSURE: 108 MMHG | RESPIRATION RATE: 16 BRPM | HEIGHT: 65 IN | BODY MASS INDEX: 29.2 KG/M2

## 2022-08-17 DIAGNOSIS — F41.1 GAD (GENERALIZED ANXIETY DISORDER): ICD-10-CM

## 2022-08-17 DIAGNOSIS — G43.111 INTRACTABLE MIGRAINE WITH AURA WITH STATUS MIGRAINOSUS: ICD-10-CM

## 2022-08-17 DIAGNOSIS — F33.9 RECURRENT MAJOR DEPRESSIVE DISORDER, REMISSION STATUS UNSPECIFIED (H): ICD-10-CM

## 2022-08-17 DIAGNOSIS — Z01.818 PREOPERATIVE EXAMINATION: Primary | ICD-10-CM

## 2022-08-17 DIAGNOSIS — F41.1 GENERALIZED ANXIETY DISORDER: ICD-10-CM

## 2022-08-17 DIAGNOSIS — B97.7 HPV IN FEMALE: ICD-10-CM

## 2022-08-17 PROCEDURE — 99213 OFFICE O/P EST LOW 20 MIN: CPT | Performed by: NURSE PRACTITIONER

## 2022-08-17 ASSESSMENT — PAIN SCALES - GENERAL: PAINLEVEL: NO PAIN (0)

## 2022-08-23 ENCOUNTER — TELEPHONE (OUTPATIENT)
Dept: SURGERY | Facility: CLINIC | Age: 47
End: 2022-08-23

## 2022-08-23 RX ORDER — LIRAGLUTIDE 6 MG/ML
3 INJECTION, SOLUTION SUBCUTANEOUS DAILY
Qty: 15 ML | Refills: 0 | Status: SHIPPED | OUTPATIENT
Start: 2022-08-23 | End: 2022-10-12

## 2022-08-23 NOTE — TELEPHONE ENCOUNTER
SAXENDA 18 MG/3 ML PEN  Last Written Prescription Date:  6/8/2022  Last Fill Quantity: 15,   # refills: 1  Last Office Visit :  4/4/2022  Future Office visit:   8/29/2022  15 mL sent to pharm to cover Pt until visit the end of August.    Thuy Gordon RN  Central Triage Red Flags/Med Refills

## 2022-08-23 NOTE — TELEPHONE ENCOUNTER
Prior authorization needed. Please start PA. Thank you!    [employer] [place] [employer]    RX BIN: [id number]   RX PCN: [facility]  RX ID: [id number]  RX GRP: [id number]

## 2022-08-23 NOTE — TELEPHONE ENCOUNTER
Central Prior Authorization Team   Phone: [phone]      PA Initiation    Medication: Saxenda -PA initiated  Insurance Company: [employer] [place] - Phone [phone] Fax [phone]  Pharmacy Filling the Rx: [id number] - [place], [place] - [address]  Filling Pharmacy Phone: [phone]  Filling Pharmacy Fax:    Start Date: 8/23/2022

## 2022-08-26 ENCOUNTER — MYC MEDICAL ADVICE (OUTPATIENT)
Dept: ENDOCRINOLOGY | Facility: CLINIC | Age: 47
End: 2022-08-26

## 2022-08-26 ENCOUNTER — TELEPHONE (OUTPATIENT)
Dept: ENDOCRINOLOGY | Facility: CLINIC | Age: 47
End: 2022-08-26

## 2022-08-26 RX ORDER — SEMAGLUTIDE 1.7 MG/.75ML
1.7 INJECTION, SOLUTION SUBCUTANEOUS WEEKLY
Qty: 3 ML | Refills: 0 | Status: SHIPPED | OUTPATIENT
Start: 2022-08-26 | End: 2022-09-23

## 2022-08-26 NOTE — TELEPHONE ENCOUNTER
Central Prior Authorization Team   Phone: [phone]      PA Initiation    Medication: Wegovy 1.7mg-PA initiated  Insurance Company: [employer] [place] - Phone [phone] Fax [phone]  Pharmacy Filling the Rx: [id number] - [place], [place] - [address]  Filling Pharmacy Phone: [phone]  Filling Pharmacy Fax:    Start Date: 8/26/2022

## 2022-08-26 NOTE — TELEPHONE ENCOUNTER
"Prior Authorization Retail Medication Request    Medication/Dose: Wegovy 1.7mg  ICD code (if different than what is on RX):  BMI 29.0-29.9,adult [Z68.29]  - Primary     Previously Tried and Failed:  History of diet and exercise, Saxenda  Rationale:  I had the pleasure of seeing your patient, Marisel Jarrett. Full intake/assessment was done to determine barriers to weight loss success and develop a treatment plan. Marisel Jarrett is a 46 year old female interested in treatment of medical problems associated with excess weight. She has a height of 5' 5\", a weight of 180 lbs 0 oz, and the calculated Body mass index is 29.95 kg/m .      Working with [facility] for the last 3 months. She has lost a few pounds, clothes fitting better and feels better but not losing more.   [place]ived in [place] [place] [facility] and more active then moved to [place] and less active and gained weight over the last 8 years from 130-185 lbs  Early menopause late 30's    Was on Saxenda for 3-4 months, lost 5lbs. Would like to switch to Wegovy for more effective weight loss.       Insurance Name:    Insurance ID:        Pharmacy Information (if different than what is on RX)  Name:  [id number] - [place] - [address] [place] [address] DAVIAN  Phone:  [phone]  "

## 2022-08-26 NOTE — TELEPHONE ENCOUNTER
PRIOR AUTHORIZATION DENIED    Medication: Saxenda -PA denied    Denial Date: 8/24/2022    Denial Rational: She has not demonstrated success with medication with a minimum of 4% weight loss since beginning the medication. She has lost 5 of the required 9 pounds while on this medication.          Appeal Information:

## 2022-08-26 NOTE — TELEPHONE ENCOUNTER
Prior authorization is needed. Please start PA. Thank you!    [employer] [place] [employer]    RX BIN: [id number]  RX PCN: [facility]  RX ID:[id number]  RX GRP: [id number]

## 2022-08-30 NOTE — TELEPHONE ENCOUNTER
Prior Authorization Approval-She can get up to 8 pens of this strength per 180 days    Authorization Effective Date: 8/29/2022  Authorization Expiration Date: 8/29/2023  Medication: Wegovy 1.7mg-PA approved  Approved Dose/Quantity:   Reference #: [id number]   Insurance Company: [facility] - Phone [phone] Fax [phone]  Expected CoPay:       CoPay Card Available:      Foundation Assistance Needed:    Which Pharmacy is filling the prescription (Not needed for infusion/clinic administered): [id number] - [place], [place] - [address] [place] [address]  Pharmacy Notified: Yes  Patient Notified: No

## 2022-09-08 ENCOUNTER — TRANSFERRED RECORDS (OUTPATIENT)
Dept: HEALTH INFORMATION MANAGEMENT | Facility: CLINIC | Age: 47
End: 2022-09-08

## 2022-09-11 ENCOUNTER — HEALTH MAINTENANCE LETTER (OUTPATIENT)
Age: 47
End: 2022-09-11

## 2022-09-23 ENCOUNTER — MYC REFILL (OUTPATIENT)
Dept: ENDOCRINOLOGY | Facility: CLINIC | Age: 47
End: 2022-09-23

## 2022-09-23 RX ORDER — SEMAGLUTIDE 1.7 MG/.75ML
1.7 INJECTION, SOLUTION SUBCUTANEOUS WEEKLY
Qty: 3 ML | Refills: 0 | Status: SHIPPED | OUTPATIENT
Start: 2022-09-23 | End: 2022-10-12

## 2022-10-04 ENCOUNTER — TRANSFERRED RECORDS (OUTPATIENT)
Dept: HEALTH INFORMATION MANAGEMENT | Facility: CLINIC | Age: 47
End: 2022-10-04

## 2022-10-12 ENCOUNTER — VIRTUAL VISIT (OUTPATIENT)
Dept: ENDOCRINOLOGY | Facility: CLINIC | Age: 47
End: 2022-10-12
Payer: COMMERCIAL

## 2022-10-12 VITALS — BODY MASS INDEX: 27.99 KG/M2 | HEIGHT: 65 IN | WEIGHT: 168 LBS

## 2022-10-12 DIAGNOSIS — E66.3 OVERWEIGHT (BMI 25.0-29.9): Primary | ICD-10-CM

## 2022-10-12 PROCEDURE — 99212 OFFICE O/P EST SF 10 MIN: CPT | Mod: 95 | Performed by: PHYSICIAN ASSISTANT

## 2022-10-12 RX ORDER — SEMAGLUTIDE 2.4 MG/.75ML
2.4 INJECTION, SOLUTION SUBCUTANEOUS WEEKLY
Qty: 3 ML | Refills: 3 | Status: SHIPPED | OUTPATIENT
Start: 2022-10-12 | End: 2023-02-07

## 2022-10-12 ASSESSMENT — PAIN SCALES - GENERAL: PAINLEVEL: NO PAIN (0)

## 2022-10-12 NOTE — NURSING NOTE
"(   Chief Complaint   Patient presents with     RECHECK     Return MW    )    ( Weight: 76.2 kg (168 lb) (Patient reported) )  ( Height: 165.1 cm (5' 5\") )  ( BMI (Calculated): 27.96 )  (   )  (   )  (   )  (   )  (   )  (   )    (   )  (   )  (   )  (   )  (   )  (   )  (   )    (   Patient Active Problem List   Diagnosis     Endometrial polyp     Post-menopausal bleeding     BMI 29.0-29.9,adult    )  (   Current Outpatient Medications   Medication Sig Dispense Refill     FLUoxetine (PROZAC) 40 MG capsule TAKE 1 CAPSULE BY MOUTH EVERY DAY 90 capsule 3     hydrOXYzine (VISTARIL) 50 MG capsule Take 1 capsule (50 mg) by mouth nightly as needed for itching 90 capsule 3     insulin pen needle (B-D U/F) 31G X 5 MM miscellaneous USE 1 PEN NEEDLES DAILY OR AS DIRECTED. 100 each 0     propranolol ER (INDERAL LA) 60 MG 24 hr capsule TAKE 1 CAPSULE BY MOUTH EVERY DAY 30 capsule 5     rizatriptan (MAXALT-MLT) 5 MG ODT Take 1-2 tablets (5-10 mg) by mouth at onset of headache for migraine (may repeat in 2 hours as needed. Max 30 mg in 24 hours) 18 tablet 6     Semaglutide-Weight Management (WEGOVY) 1.7 MG/0.75ML SOAJ Inject 1.7 mg Subcutaneous once a week 3 mL 0     liraglutide - Weight Management (SAXENDA) 18 MG/3ML pen Inject 3 mg Subcutaneous daily (Patient not taking: Reported on 10/12/2022) 15 mL 0    )  ( Diabetes Eval:    )    ( Pain Eval:  No Pain (0) )    ( Wound Eval:       )    (   History   Smoking Status     Former     Types: Cigarettes     Start date: 6/8/1993     Quit date: 6/8/2010   Smokeless Tobacco     Former    )    ( Signed By:  Kary Cordon, EMT; October 12, 2022; 1:34 PM )    "

## 2022-10-12 NOTE — PROGRESS NOTES
Marisel is a 47 year old who is being evaluated via a billable video visit.      How would you like to obtain your AVS? MyChart  If the video visit is dropped, the invitation should be resent by: Text to cell phone: [phone]  Will anyone else be joining your video visit? No    During this virtual visit the patient is located in [place], patient verifies this as the location during the entirety of this visit.     Video-Visit Details    Video Start Time: 2:31 PM    Type of service:  Video Visit    Video End Time:250    Originating Location (pt. Location): Home    Distant Location (provider location):  [facility] [place]     Platform used for Video Visit: [employer]       [facility] Management Note     Marisel Jarrett  MRN:  [id number]  :  1975  SUSAN:  10/12/2022    Dear Bebe Palmer MD,    I had the pleasure of seeing your patient Marisel Jarrett. She is a 47 year old female who I am continuing to see for treatment of obesity related to:       2022   I have the following health issues associated with obesity: None of the above   I have the following symptoms associated with obesity: Back Pain       Assessment & Plan   Problem List Items Addressed This Visit     Overweight (BMI 25.0-29.9) - Primary    Relevant Medications    Semaglutide-Weight Management (WEGOVY) 2.4 MG/0.75ML SOAJ      Increase Wegovy to 2.4mg weekly  Follow up 3 months return MWM    INTERVAL HISTORY:  MWM follow up    Lost from 180 to 168 lbs  Intermittent fasting, added running    Doing well on Wegovy 1.7mg weekly    CURRENT WEIGHT:   168 lbs 0 oz    Initial Weight (lbs): 180 lbs  Last Visits Weight: 81.6 kg (180 lb)  Cumulative weight loss (lbs): 12  Weight Loss Percentage: 6.67%    Changes and Difficulties 10/11/2022   I have made the following changes to my diet since my last visit: I have been doing intermittent fasting.   With regards to my diet, I am still struggling with:  "Losing weight   I have made the following changes to my activity/exercise since my last visit: Added running         MEDICATIONS:   Current Outpatient Medications   Medication Sig Dispense Refill     FLUoxetine (PROZAC) 40 MG capsule TAKE 1 CAPSULE BY MOUTH EVERY DAY 90 capsule 3     hydrOXYzine (VISTARIL) 50 MG capsule Take 1 capsule (50 mg) by mouth nightly as needed for itching 90 capsule 3     insulin pen needle (B-D U/F) 31G X 5 MM miscellaneous USE 1 PEN NEEDLES DAILY OR AS DIRECTED. 100 each 0     propranolol ER (INDERAL LA) 60 MG 24 hr capsule TAKE 1 CAPSULE BY MOUTH EVERY DAY 30 capsule 5     rizatriptan (MAXALT-MLT) 5 MG ODT Take 1-2 tablets (5-10 mg) by mouth at onset of headache for migraine (may repeat in 2 hours as needed. Max 30 mg in 24 hours) 18 tablet 6     Semaglutide-Weight Management (WEGOVY) 2.4 MG/0.75ML SOAJ Inject 2.4 mg Subcutaneous once a week 3 mL 3       Weight Loss Medication History Reviewed With Patient 10/11/2022   Which weight loss medications are you currently taking on a regular basis?  Wegovy   Are you having any side effects from the weight loss medication that we have prescribed you? No   If you are having side effects please describe: -       PHYSICAL EXAM:  Objective    Ht 1.651 m (5' 5\")   Wt 76.2 kg (168 lb)   BMI 27.96 kg/m               GENERAL: Healthy, alert and no distress  EYES: Eyes grossly normal to inspection.  No discharge or erythema, or obvious scleral/conjunctival abnormalities.  RESP: No audible wheeze, cough, or visible cyanosis.  No visible retractions or increased work of breathing.    SKIN: Visible skin clear. No significant rash, abnormal pigmentation or lesions.  NEURO: Cranial nerves grossly intact.  Mentation and speech appropriate for age.  PSYCH: Mentation appears normal, affect normal/bright, judgement and insight intact, normal speech and appearance well-groomed.        Sincerely,    Sandhya Stein PA-C      10 minutes spent on the date " of the encounter doing chart review, history and exam, documentation and further activities per the note

## 2022-10-12 NOTE — LETTER
10/12/2022       RE: Marisel Jarrett  [address] [place] [address]     Dear Colleague,    Thank you for referring your patient, Marisel Jarrett, to the [facility] WEIGHT MANAGEMENT CLINIC [place] at [facility]. Please see a copy of my visit note below.    Marisel is a 47 year old who is being evaluated via a billable video visit.      How would you like to obtain your AVS? MyChart  If the video visit is dropped, the invitation should be resent by: Text to cell phone: [phone]  Will anyone else be joining your video visit? No    During this virtual visit the patient is located in [place], patient verifies this as the location during the entirety of this visit.     Video-Visit Details    Video Start Time: 2:31 PM    Type of service:  Video Visit    Video End Time:250    Originating Location (pt. Location): Home    Distant Location (provider location):  [facility] WE[facility]NAGEMENT CLINIC [place]     Platform used for Video Visit: [employer]       [facility] Medical Weight Management Note     Marisel Jarrett  MRN:  [id number]  :  1975  SUSAN:  10/12/2022    Dear Bebe Palmer MD,    I had the pleasure of seeing your patient Marisel Jarrett. She is a 47 year old female who I am continuing to see for treatment of obesity related to:       2022   I have the following health issues associated with obesity: None of the above   I have the following symptoms associated with obesity: Back Pain       Assessment & Plan   Problem List Items Addressed This Visit     Overweight (BMI 25.0-29.9) - Primary    Relevant Medications    Semaglutide-Weight Management (WEGOVY) 2.4 MG/0.75ML SOAJ      Increase Wegovy to 2.4mg weekly  Follow up 3 months return MWM    INTERVAL HISTORY:  MWM follow up    Lost from 180 to 168 lbs  Intermittent fasting, added running    Doing well on Wegovy 1.7mg weekly    CURRENT WEIGHT:   168 lbs 0  "oz    Initial Weight (lbs): 180 lbs  Last Visits Weight: 81.6 kg (180 lb)  Cumulative weight loss (lbs): 12  Weight Loss Percentage: 6.67%    Changes and Difficulties 10/11/2022   I have made the following changes to my diet since my last visit: I have been doing intermittent fasting.   With regards to my diet, I am still struggling with: Losing weight   I have made the following changes to my activity/exercise since my last visit: Added running         MEDICATIONS:   Current Outpatient Medications   Medication Sig Dispense Refill     FLUoxetine (PROZAC) 40 MG capsule TAKE 1 CAPSULE BY MOUTH EVERY DAY 90 capsule 3     hydrOXYzine (VISTARIL) 50 MG capsule Take 1 capsule (50 mg) by mouth nightly as needed for itching 90 capsule 3     insulin pen needle (B-D U/F) 31G X 5 MM miscellaneous USE 1 PEN NEEDLES DAILY OR AS DIRECTED. 100 each 0     propranolol ER (INDERAL LA) 60 MG 24 hr capsule TAKE 1 CAPSULE BY MOUTH EVERY DAY 30 capsule 5     rizatriptan (MAXALT-MLT) 5 MG ODT Take 1-2 tablets (5-10 mg) by mouth at onset of headache for migraine (may repeat in 2 hours as needed. Max 30 mg in 24 hours) 18 tablet 6     Semaglutide-Weight Management (WEGOVY) 2.4 MG/0.75ML SOAJ Inject 2.4 mg Subcutaneous once a week 3 mL 3       Weight Loss Medication History Reviewed With Patient 10/11/2022   Which weight loss medications are you currently taking on a regular basis?  Wegovy   Are you having any side effects from the weight loss medication that we have prescribed you? No   If you are having side effects please describe: -       PHYSICAL EXAM:  Objective     Ht 1.651 m (5' 5\")   Wt 76.2 kg (168 lb)   BMI 27.96 kg/m               GENERAL: Healthy, alert and no distress  EYES: Eyes grossly normal to inspection.  No discharge or erythema, or obvious scleral/conjunctival abnormalities.  RESP: No audible wheeze, cough, or visible cyanosis.  No visible retractions or increased work of breathing.    SKIN: Visible skin clear. No " significant rash, abnormal pigmentation or lesions.  NEURO: Cranial nerves grossly intact.  Mentation and speech appropriate for age.  PSYCH: Mentation appears normal, affect normal/bright, judgement and insight intact, normal speech and appearance well-groomed.        Sincerely,    Sandhya Stein PA-C      10 minutes spent on the date of the encounter doing chart review, history and exam, documentation and further activities per the note

## 2022-10-13 ENCOUNTER — TELEPHONE (OUTPATIENT)
Dept: SURGERY | Facility: CLINIC | Age: 47
End: 2022-10-13

## 2022-10-18 DIAGNOSIS — G43.101 MIGRAINE WITH AURA AND WITH STATUS MIGRAINOSUS, NOT INTRACTABLE: ICD-10-CM

## 2022-10-18 NOTE — TELEPHONE ENCOUNTER
Rx Authorization:  Requested Medication/ Dose RIZATRIPTAN 5 MG ODT  Date last refill ordered: 10/14/21  Quantity ordered:   # refills:   Date of last clinic visit with ordering provider:   Date of next clinic visit with ordering provider:   All pertinent protocol data (lab date/result):   Include pertinent information from patients message:

## 2022-10-19 ENCOUNTER — TELEPHONE (OUTPATIENT)
Dept: ENDOCRINOLOGY | Facility: CLINIC | Age: 47
End: 2022-10-19

## 2022-10-19 NOTE — TELEPHONE ENCOUNTER
KAROLINEM sent my chart message letting pt know that the provider would like the follow up with pt     F/u with Sandhya Stein

## 2022-10-20 PROBLEM — E66.3 OVERWEIGHT (BMI 25.0-29.9): Status: ACTIVE | Noted: 2022-10-20

## 2022-10-20 RX ORDER — RIZATRIPTAN BENZOATE 5 MG/1
5-10 TABLET, ORALLY DISINTEGRATING ORAL
Qty: 18 TABLET | Refills: 9 | Status: SHIPPED | OUTPATIENT
Start: 2022-10-20 | End: 2023-10-27

## 2022-10-27 DIAGNOSIS — G43.101 MIGRAINE WITH AURA AND WITH STATUS MIGRAINOSUS, NOT INTRACTABLE: ICD-10-CM

## 2022-10-27 NOTE — TELEPHONE ENCOUNTER
Rx Authorization:  Requested Medication/ Dose: Propranolol ER   Date last refill ordered: 2/12/22  Quantity ordered: 30 tabs  # refills: 5  Date of last clinic visit with ordering provider: 1/11/22  Date of next clinic visit with ordering provider: F/U 6-8 weeks  All pertinent protocol data (lab date/result):   Include pertinent information from patients message:

## 2022-10-28 RX ORDER — PROPRANOLOL HCL 60 MG
CAPSULE, EXTENDED RELEASE 24HR ORAL
Qty: 90 CAPSULE | Refills: 3 | Status: SHIPPED | OUTPATIENT
Start: 2022-10-28 | End: 2023-11-28

## 2023-02-06 DIAGNOSIS — E66.3 OVERWEIGHT (BMI 25.0-29.9): ICD-10-CM

## 2023-02-07 ENCOUNTER — TELEPHONE (OUTPATIENT)
Dept: ENDOCRINOLOGY | Facility: CLINIC | Age: 48
End: 2023-02-07
Payer: COMMERCIAL

## 2023-02-07 DIAGNOSIS — E66.3 OVERWEIGHT (BMI 25.0-29.9): ICD-10-CM

## 2023-02-07 RX ORDER — SEMAGLUTIDE 2.4 MG/.75ML
2.4 INJECTION, SOLUTION SUBCUTANEOUS WEEKLY
Qty: 3 ML | Status: CANCELLED | OUTPATIENT
Start: 2023-02-07

## 2023-02-07 RX ORDER — SEMAGLUTIDE 2.4 MG/.75ML
2.4 INJECTION, SOLUTION SUBCUTANEOUS WEEKLY
Qty: 3 ML | Refills: 1 | Status: SHIPPED | OUTPATIENT
Start: 2023-02-07 | End: 2023-03-27

## 2023-02-07 NOTE — TELEPHONE ENCOUNTER
Pt called to schedule an appointment. appointment is scheduled for March. Is there any way pt can get a refill to last until her appointment. She is out of her Wegovy.     Call Back Number: [phone]  Okay to leave detailed message     Thanks~aap

## 2023-02-07 NOTE — TELEPHONE ENCOUNTER
Refill denied, as patient needs appointment. [facility] message sent to patient as requested.

## 2023-02-07 NOTE — TELEPHONE ENCOUNTER
Semaglutide-Weight Management (WEGOVY) 2.4 MG/0.75ML SOAJ  Last Written Prescription Date:   10/12/2022  Last Fill Quantity: 3,   # refills: 3  Last Office Visit :  10/12/2022  Future Office visit:   3/27/2023    Routing refill request to provider for review/approval because:  Pt has a visit in March.  Pt asking for a refill to cover her until March 27, 2023 appointment.  Refer to clinic for review and refill per Providers orders for Pt care.       Thuy Gordon RN  Central Triage Red Flags/Med Refills

## 2023-02-27 ENCOUNTER — TRANSFERRED RECORDS (OUTPATIENT)
Dept: HEALTH INFORMATION MANAGEMENT | Facility: CLINIC | Age: 48
End: 2023-02-27

## 2023-03-27 ENCOUNTER — VIRTUAL VISIT (OUTPATIENT)
Dept: ENDOCRINOLOGY | Facility: CLINIC | Age: 48
End: 2023-03-27
Payer: COMMERCIAL

## 2023-03-27 ENCOUNTER — TELEPHONE (OUTPATIENT)
Dept: ENDOCRINOLOGY | Facility: CLINIC | Age: 48
End: 2023-03-27

## 2023-03-27 VITALS — HEIGHT: 65 IN | WEIGHT: 158 LBS | BODY MASS INDEX: 26.33 KG/M2

## 2023-03-27 DIAGNOSIS — E66.3 OVERWEIGHT (BMI 25.0-29.9): ICD-10-CM

## 2023-03-27 PROCEDURE — 99213 OFFICE O/P EST LOW 20 MIN: CPT | Mod: VID | Performed by: PHYSICIAN ASSISTANT

## 2023-03-27 RX ORDER — SEMAGLUTIDE 2.4 MG/.75ML
2.4 INJECTION, SOLUTION SUBCUTANEOUS WEEKLY
Qty: 9 ML | Refills: 1 | Status: SHIPPED | OUTPATIENT
Start: 2023-03-27 | End: 2023-09-20

## 2023-03-27 ASSESSMENT — PAIN SCALES - GENERAL: PAINLEVEL: NO PAIN (0)

## 2023-03-27 NOTE — TELEPHONE ENCOUNTER
3/28 left  for staff Sandhya Troy PA-C  P p Bariatric Scheduling Registration Pool  6 mo Sandhya return MWM please

## 2023-03-27 NOTE — PROGRESS NOTES
Marisel Jarrett is a 47 year old who is being evaluated via a billable video visit.      How would you like to obtain your AVS? MyChart  If the video visit is dropped, the invitation should be resent by: Text to cell phone: [phone]  Will anyone else be joining your video visit? No    During this virtual visit the patient is located in [place], patient verifies this as the location during the entirety of this visit.        Return Medical Weight Management Note     Marisel Jarrett  MRN:  [id number]  :  1975  SUSAN:  3/27/2023    Dear No primary care provider on file.,    I had the pleasure of seeing your patient Marisel Jarrett. She is a 47 year old female who I am continuing to see for treatment of obesity related to:       2022   I have the following health issues associated with obesity: None of the above   I have the following symptoms associated with obesity: Back Pain       Assessment & Plan   Problem List Items Addressed This Visit     Overweight (BMI 25.0-29.9)      Continue Wegovy 2.4mg  Has lost from 180 to 158 and BMI 26 now  Active and feeling good    Follow up 6 mo Sandhya return MW or earlier if needed    INTERVAL HISTORY:    10/12/22 Last visit:  -Increased wegovy to 2.4mg  -had lost from 180 to 168  -added running and intermittent fasting    3/27/23 Follow up (today)  -doing well on wegovy 2.4mg weekly  -lost 10 lbs since last visit  -weight is stable  -feels great, active    Anti-obesity medications:     Current:   Wegovy 2.4mg    Failed/contraindicated:   None    CURRENT WEIGHT:   158 lbs 0 oz    Initial Weight (lbs): 180 lbs  Last Visits Weight: 76.2 kg (168 lb)  Cumulative weight loss (lbs): 22  Weight Loss Percentage: 12.22%    Changes and Difficulties 3/27/2023   I have made the following changes to my diet since my last visit: No   With regards to my diet, I am still struggling with: Plateaued   I have made the following changes to my activity/exercise since  "my last visit: No - pretty active, plays tennis   With regards to my activity/exercise, I am still struggling with: No       MEDICATIONS:   Current Outpatient Medications   Medication Sig Dispense Refill     FLUoxetine (PROZAC) 40 MG capsule TAKE 1 CAPSULE BY MOUTH EVERY DAY 90 capsule 3     hydrOXYzine (VISTARIL) 50 MG capsule Take 1 capsule (50 mg) by mouth nightly as needed for itching 90 capsule 3     insulin pen needle (B-D U/F) 31G X 5 MM miscellaneous USE 1 PEN NEEDLES DAILY OR AS DIRECTED. 100 each 0     propranolol ER (INDERAL LA) 60 MG 24 hr capsule TAKE 1 CAPSULE BY MOUTH EVERY DAY 90 capsule 3     rizatriptan (MAXALT-MLT) 5 MG ODT TAKE 1-2 TABLETS (5-10 MG) BY MOUTH AT ONSET OF HEADACHE FOR MIGRAINE (MAY REPEAT IN 2 HOURS AS NEEDED. MAX 30 MG IN 24 HOURS) 18 tablet 9     WEGOVY 2.4 MG/0.75ML SOAJ INJECT 2.4 MG SUBCUTANEOUS ONCE A WEEK 3 mL 1       Weight Loss Medication History Reviewed With Patient 3/27/2023   Which weight loss medications are you currently taking on a regular basis? Wegovy   Are you having any side effects from the weight loss medication that we have prescribed you? -   If you are having side effects please describe: occassional reflux     PHYSICAL EXAM:  Objective    Ht 1.651 m (5' 5\")   Wt 71.7 kg (158 lb)   BMI 26.29 kg/m      Vitals - Patient Reported  Pain Score: No Pain (0)        GENERAL: Healthy, alert and no distress  EYES: Eyes grossly normal to inspection.  No discharge or erythema, or obvious scleral/conjunctival abnormalities.  RESP: No audible wheeze, cough, or visible cyanosis.  No visible retractions or increased work of breathing.    SKIN: Visible skin clear. No significant rash, abnormal pigmentation or lesions.  NEURO: Cranial nerves grossly intact.  Mentation and speech appropriate for age.  PSYCH: Mentation appears normal, affect normal/bright, judgement and insight intact, normal speech and appearance well-groomed.        Sincerely,    Sandhya Stein, " PA-C      10 minutes spent by me on the date of the encounter doing chart review, history and exam, documentation and further activities per the note

## 2023-03-27 NOTE — LETTER
3/27/2023       RE: Marisel Jarrett  [address] [place] [address]     Dear Colleague,    Thank you for referring your patient, Marisel Jarrett, to the [facility] WEIGHT MANAGEMENT CLINIC [place] at [facility]. Please see a copy of my visit note below.    Marisel Jarrett is a 47 year old who is being evaluated via a billable video visit.      How would you like to obtain your AVS? MyChart  If the video visit is dropped, the invitation should be resent by: Text to cell phone: [phone]  Will anyone else be joining your video visit? No    During this virtual visit the patient is located in [place], patient verifies this as the location during the entirety of this visit.        Return Medical Weight Management Note     Marisel Jarrett  MRN:  [id number]  :  1975  SUSAN:  3/27/2023    Dear No primary care provider on file.,    I had the pleasure of seeing your patient Marisel Jarrett. She is a 47 year old female who I am continuing to see for treatment of obesity related to:       2022   I have the following health issues associated with obesity: None of the above   I have the following symptoms associated with obesity: Back Pain       Assessment & Plan   Problem List Items Addressed This Visit     Overweight (BMI 25.0-29.9)      Continue Wegovy 2.4mg  Has lost from 180 to 158 and BMI 26 now  Active and feeling good    Follow up 6 mo Sandhya return [facility] or earlier if needed    INTERVAL HISTORY:    10/12/22 Last visit:  -Increased wegovy to 2.4mg  -had lost from 180 to 168  -added running and intermittent fasting    3/27/23 Follow up (today)  -doing well on wegovy 2.4mg weekly  -lost 10 lbs since last visit  -weight is stable  -feels great, active    Anti-obesity medications:     Current:   Wegovy 2.4mg    Failed/contraindicated:   None    CURRENT WEIGHT:   158 lbs 0 oz    Initial Weight (lbs): 180 lbs  Last  "Visits Weight: 76.2 kg (168 lb)  Cumulative weight loss (lbs): 22  Weight Loss Percentage: 12.22%    Changes and Difficulties 3/27/2023   I have made the following changes to my diet since my last visit: No   With regards to my diet, I am still struggling with: Plateaued   I have made the following changes to my activity/exercise since my last visit: No - pretty active, plays tennis   With regards to my activity/exercise, I am still struggling with: No       MEDICATIONS:   Current Outpatient Medications   Medication Sig Dispense Refill     FLUoxetine (PROZAC) 40 MG capsule TAKE 1 CAPSULE BY MOUTH EVERY DAY 90 capsule 3     hydrOXYzine (VISTARIL) 50 MG capsule Take 1 capsule (50 mg) by mouth nightly as needed for itching 90 capsule 3     insulin pen needle (B-D U/F) 31G X 5 MM miscellaneous USE 1 PEN NEEDLES DAILY OR AS DIRECTED. 100 each 0     propranolol ER (INDERAL LA) 60 MG 24 hr capsule TAKE 1 CAPSULE BY MOUTH EVERY DAY 90 capsule 3     rizatriptan (MAXALT-MLT) 5 MG ODT TAKE 1-2 TABLETS (5-10 MG) BY MOUTH AT ONSET OF HEADACHE FOR MIGRAINE (MAY REPEAT IN 2 HOURS AS NEEDED. MAX 30 MG IN 24 HOURS) 18 tablet 9     WEGOVY 2.4 MG/0.75ML SOAJ INJECT 2.4 MG SUBCUTANEOUS ONCE A WEEK 3 mL 1       Weight Loss Medication History Reviewed With Patient 3/27/2023   Which weight loss medications are you currently taking on a regular basis? Wegovy   Are you having any side effects from the weight loss medication that we have prescribed you? -   If you are having side effects please describe: occassional reflux     PHYSICAL EXAM:  Objective     Ht 1.651 m (5' 5\")   Wt 71.7 kg (158 lb)   BMI 26.29 kg/m      Vitals - Patient Reported  Pain Score: No Pain (0)        GENERAL: Healthy, alert and no distress  EYES: Eyes grossly normal to inspection.  No discharge or erythema, or obvious scleral/conjunctival abnormalities.  RESP: No audible wheeze, cough, or visible cyanosis.  No visible retractions or increased work of breathing.  "   SKIN: Visible skin clear. No significant rash, abnormal pigmentation or lesions.  NEURO: Cranial nerves grossly intact.  Mentation and speech appropriate for age.  PSYCH: Mentation appears normal, affect normal/bright, judgement and insight intact, normal speech and appearance well-groomed.        Sincerely,    Sandhya Stein PA-C      10 minutes spent by me on the date of the encounter doing chart review, history and exam, documentation and further activities per the note      Again, thank you for allowing me to participate in the care of your patient.      Sincerely,    Sandhya Stein PA-C

## 2023-03-27 NOTE — LETTER
Date:March 28, 2023      Provider requested that no letter be sent. Do not send.       [facility]

## 2023-04-03 ENCOUNTER — TELEPHONE (OUTPATIENT)
Dept: ENDOCRINOLOGY | Facility: CLINIC | Age: 48
End: 2023-04-03
Payer: COMMERCIAL

## 2023-04-03 NOTE — TELEPHONE ENCOUNTER
JANIE and sent myc for scheduling return in about 6 months (around 9/27/2023) for follow up with Sandhya Stein.

## 2023-05-06 ENCOUNTER — HEALTH MAINTENANCE LETTER (OUTPATIENT)
Age: 48
End: 2023-05-06

## 2023-05-23 ENCOUNTER — TRANSFERRED RECORDS (OUTPATIENT)
Dept: HEALTH INFORMATION MANAGEMENT | Facility: CLINIC | Age: 48
End: 2023-05-23
Payer: COMMERCIAL

## 2023-06-01 ENCOUNTER — TRANSFERRED RECORDS (OUTPATIENT)
Dept: HEALTH INFORMATION MANAGEMENT | Facility: CLINIC | Age: 48
End: 2023-06-01
Payer: COMMERCIAL

## 2023-08-17 ENCOUNTER — TELEPHONE (OUTPATIENT)
Dept: SURGERY | Facility: CLINIC | Age: 48
End: 2023-08-17
Payer: COMMERCIAL

## 2023-08-17 NOTE — TELEPHONE ENCOUNTER
PA Initiation    Medication: WEGOVY 2.4 MG/0.75ML SC SOAJ  Insurance Company: [employer] [place] - Phone [phone] Fax [phone]  Pharmacy Filling the Rx: [facility]/PHARMACY #[id number] - [place], [place] - [address]  Filling Pharmacy Phone: [phone]  Filling Pharmacy Fax: [phone]  Start Date: 8/17/2023           Thank you,     Compa Randall [facility]  Pharmacy Clinic [facility]  [email or web address]   Phone: [phone]  Fax: [phone]

## 2023-08-18 NOTE — TELEPHONE ENCOUNTER
Sent additional information to [employer] via fax at [phone]. Fax confirmed sent.     Thank you,     Compa Randall [facility]  Pharmacy Clinic [facility]  [email or web address]   Phone: [phone]  Fax: [phone]

## 2023-08-21 NOTE — TELEPHONE ENCOUNTER
Prior Authorization Approval    Medication: WEGOVY 2.4 MG/0.75ML SC SOAJ  Authorization Effective Date: 8/30/2023  Authorization Expiration Date: 8/30/2024  Approved Dose/Quantity: 3 ml per 28 days  Reference #: [id number]   Insurance Company: [employer] [place] - Phone [phone] Fax [phone]  Expected CoPay:       CoPay Card Available:      Financial Assistance Needed: No  Which Pharmacy is filling the prescription: [facility]/PHARMACY #[id number] - [place], [place] - [address] [place] [address]  Pharmacy Notified: No - renewal only  Patient Notified: No - renewal only           Thank you,     Compa Randall [facility]  [facility] Clinic [email or web address]   Phone: [phone]  Fax: [phone]

## 2023-09-17 DIAGNOSIS — E66.3 OVERWEIGHT (BMI 25.0-29.9): ICD-10-CM

## 2023-09-20 ENCOUNTER — TELEPHONE (OUTPATIENT)
Dept: SURGERY | Facility: CLINIC | Age: 48
End: 2023-09-20
Payer: COMMERCIAL

## 2023-09-20 RX ORDER — SEMAGLUTIDE 2.4 MG/.75ML
2.4 INJECTION, SOLUTION SUBCUTANEOUS WEEKLY
Qty: 9 ML | Refills: 0 | Status: SHIPPED | OUTPATIENT
Start: 2023-09-20 | End: 2023-12-27

## 2023-09-20 NOTE — TELEPHONE ENCOUNTER
Medication Question or Refill    Contacts         Type Contact Phone/Fax    09/20/2023 11:23 AM CDT Phone (Incoming) Marisel Chavarria (Self) [phone] ([place])            What medication are you calling about (include dose and sig)?: Wegovy 2.4 mg    Preferred Pharmacy:   [facility]/pharmacy #[id number] - [place], [place] - [address]  [address] [place] [address]  Phone: [phone] Fax: [phone]      Controlled Substance Agreement on file:   CSA -- Patient Level:    CSA: None found at the patient level.       Who prescribed the medication?: Sandhya Stein    Do you need a refill? Yes    When did you use the medication last? Sunday (09/17/2023)    Patient offered an appointment? No    Do you have any questions or concerns?  No      Could we send this information to you in [facility] or would you prefer to receive a phone call?:   No preference   Okay to leave a detailed message?: Yes at Cell number on file:    Telephone Information:   Mobile [phone]

## 2023-09-20 NOTE — TELEPHONE ENCOUNTER
Semaglutide-Weight Management (WEGOVY) 2.4 MG/0.75ML pen     Last Written Prescription Date:  3/27/23  Last Fill Quantity: 9 ml ,   # refills: 1  Last Office Visit : 3/27/23  Future Office visit:  None       Preferred Pharmacy:   [facility]/pharmacy #[id number] - [place], [place] - [address]  [address] [place] [address]  Phone: [phone] Fax: [phone]    Received refill request for  WEGOVY . Patient needs appointment scheduled prior to any refills. Clinic Coordinator notified and will follow up with the patient as appropriate.       Scheduling has been notified to contact the pt for appointment.

## 2023-09-20 NOTE — TELEPHONE ENCOUNTER
Refill denied at this time. Patient needs appointment with Sandhya Stein PA-C. Steph[facility] message sent to patient to schedule appointment.

## 2023-10-27 DIAGNOSIS — G43.101 MIGRAINE WITH AURA AND WITH STATUS MIGRAINOSUS, NOT INTRACTABLE: ICD-10-CM

## 2023-10-27 RX ORDER — RIZATRIPTAN BENZOATE 5 MG/1
5-10 TABLET, ORALLY DISINTEGRATING ORAL
Qty: 12 TABLET | Refills: 0 | Status: SHIPPED | OUTPATIENT
Start: 2023-10-27 | End: 2024-05-09 | Stop reason: ALTCHOICE

## 2023-10-27 NOTE — TELEPHONE ENCOUNTER
Rx Authorization:  Requested Medication/ Dose  RIZATRIPTAN 5 MG ODT   Date last refill ordered: 10/20/22  Quantity ordered: 18 tabs  # refills: 9  Date of last clinic visit with ordering provider: 4/6/22  Date of next clinic visit with ordering provider:   All pertinent protocol data (lab date/result):   Include pertinent information from patients message:

## 2023-11-26 DIAGNOSIS — G43.101 MIGRAINE WITH AURA AND WITH STATUS MIGRAINOSUS, NOT INTRACTABLE: ICD-10-CM

## 2023-11-27 NOTE — TELEPHONE ENCOUNTER
RX Authorization    Medication: propranolol ER (INDERAL LA) 60 MG 24 hr capsule     Date last refill ordered:  10/28/22    Quantity ordered:  90    # refills: 0    Date of last clinic visit with ordering provider:4/6/22    Date of next clinic visit with ordering provider: 1/18/24    All pertinent protocol data (lab date/result):    Include pertinent information from patients message:

## 2023-11-28 RX ORDER — PROPRANOLOL HCL 60 MG
CAPSULE, EXTENDED RELEASE 24HR ORAL
Qty: 90 CAPSULE | Refills: 4 | Status: SHIPPED | OUTPATIENT
Start: 2023-11-28 | End: 2024-09-19

## 2023-12-26 DIAGNOSIS — E66.3 OVERWEIGHT (BMI 25.0-29.9): ICD-10-CM

## 2023-12-27 ENCOUNTER — VIRTUAL VISIT (OUTPATIENT)
Dept: ENDOCRINOLOGY | Facility: CLINIC | Age: 48
End: 2023-12-27
Payer: COMMERCIAL

## 2023-12-27 ENCOUNTER — TELEPHONE (OUTPATIENT)
Dept: SURGERY | Facility: CLINIC | Age: 48
End: 2023-12-27

## 2023-12-27 VITALS — HEIGHT: 65 IN | WEIGHT: 165 LBS | BODY MASS INDEX: 27.49 KG/M2

## 2023-12-27 DIAGNOSIS — E66.3 OVERWEIGHT (BMI 25.0-29.9): Primary | ICD-10-CM

## 2023-12-27 DIAGNOSIS — M54.41 BILATERAL LOW BACK PAIN WITH RIGHT-SIDED SCIATICA, UNSPECIFIED CHRONICITY: ICD-10-CM

## 2023-12-27 PROCEDURE — 99212 OFFICE O/P EST SF 10 MIN: CPT | Mod: VID | Performed by: PHYSICIAN ASSISTANT

## 2023-12-27 RX ORDER — SEMAGLUTIDE 2.4 MG/.75ML
2.4 INJECTION, SOLUTION SUBCUTANEOUS WEEKLY
Qty: 3 ML | Refills: 0 | Status: SHIPPED | OUTPATIENT
Start: 2023-12-27 | End: 2024-03-27

## 2023-12-27 ASSESSMENT — PAIN SCALES - GENERAL: PAINLEVEL: NO PAIN (0)

## 2023-12-27 NOTE — PROGRESS NOTES
Return Medical Weight Management Note     Marisel Jarrett  MRN:  [id number]  :  1975  SUSAN:  2023    Dear Bebe Palmer MD,    I had the pleasure of seeing your patient Marisel Jarrett. She is a 48 year old female who I am continuing to see for treatment of obesity related to:        2022    12:50 PM   --   I have the following health issues associated with obesity None of the above   I have the following symptoms associated with obesity Back Pain       Assessment & Plan   Problem List Items Addressed This Visit       Overweight (BMI 25.0-29.9) - Primary    Relevant Medications    tirzepatide-Weight Management (ZEPBOUND) 7.5 MG/0.5ML prefilled pen    Semaglutide-Weight Management (WEGOVY) 2.4 MG/0.75ML pen     Other Visit Diagnoses       Bilateral low back pain with right-sided sciatica, unspecified chronicity        Relevant Medications    tirzepatide-Weight Management (ZEPBOUND) 7.5 MG/0.5ML prefilled pen           Weight mgmt follow up    -still taking wegovy  -weight down from highest 200 to 165 today. Up 7 lbs since last visit  -interested in seeing if zepbound covered, will send 7.5mg  -send wegovy 2.4mg one month so no gap in treatment  -follow up 3-4 mo Sandhya       INTERVAL HISTORY:      10/12/22:  -Increased wegovy to 2.4mg  -had lost from 180 to 168  -added running and intermittent fasting     3/27/23 (last visit)  -doing well on wegovy 2.4mg weekly  -lost 10 lbs since last visit  -weight is stable  -feels great, active    23 (today)  -still taking wegovy  -weight down from highest 200 to 165 today. Up 7 lbs since last visit  -interested in seeing if zepbound covered, will send 7.5mg  -send wegovy 2.4mg one month so no gap in treatment  -follow up 3-4 mo Sandhya       Anti-obesity medication history    Current:   Wegovy 2.4mg    CURRENT WEIGHT:   165 lbs 0 oz    Highest in life 200 lbs prior to starting saxenda.   Initial Weight (lbs): 180 lbs  Last Visits Weight:  71.7 kg (158 lb)  Cumulative weight loss (lbs): 15  Weight Loss Percentage: 8.33%        12/27/2023    10:18 AM   Changes and Difficulties   I have made the following changes to my diet since my last visit: [place]   With regards to my diet, I am still struggling with: My weight has flatlined   I have made the following changes to my activity/exercise since my last visit: None   With regards to my activity/exercise, I am still struggling with: [place] weights in         MEDICATIONS:   Current Outpatient Medications   Medication Sig Dispense Refill    FLUoxetine (PROZAC) 40 MG capsule TAKE 1 CAPSULE BY MOUTH EVERY DAY 90 capsule 3    hydrOXYzine (VISTARIL) 50 MG capsule Take 1 capsule (50 mg) by mouth nightly as needed for itching 90 capsule 3    insulin pen needle (B-D U/F) 31G X 5 MM miscellaneous USE 1 PEN NEEDLES DAILY OR AS DIRECTED. 100 each 0    propranolol ER (INDERAL LA) 60 MG 24 hr capsule TAKE 1 CAPSULE BY MOUTH EVERY DAY 90 capsule 4    rizatriptan (MAXALT-MLT) 5 MG ODT TAKE 1-2 TABLETS (5-10 MG) BY MOUTH AT ONSET OF HEADACHE FOR MIGRAINE (MAY REPEAT IN 2 HOURS AS NEEDED. MAX 30 MG IN 24 HOURS) 12 tablet 0    Semaglutide-Weight Management (WEGOVY) 2.4 MG/0.75ML pen Inject 2.4 mg Subcutaneous once a week 3 mL 0    tirzepatide-Weight Management (ZEPBOUND) 7.5 MG/0.5ML prefilled pen Inject 0.5 mLs (7.5 mg) Subcutaneous every 7 days 2 mL 3           12/27/2023    10:18 AM   Weight Loss Medication History Reviewed With Patient   Which weight loss medications are you currently taking on a regular basis? Wegovy   Are you having any side effects from the weight loss medication that we have prescribed you? No       Ambulatory - [facility] on 01/22/2021   Component Date Value Ref Range Status    SARS-CoV-2 Virus Specimen Source 01/22/2021 Nasopharyngeal   Final    SARS-CoV-2 PCR Result 01/22/2021 NEGATIVE   Final    SARS-CoV2 (COVID-19) RNA not detected, presumed negative.    SARS-CoV-2 PCR Comment 01/22/2021 Testing  "was performed using the Aptima SARS-CoV-2 Assay on the [place]   Final    Comment: Instrument System. Additional information about this Emergency Use  Authorization (EUA) assay can be found via the Lab Guide.  This test should be ordered for the detection of SARS-CoV-2 in individuals who  meet SARS-CoV-2 clinical and/or epidemiological criteria. Test performance is  unknown in asymptomatic patients.  This test is for in vitro diagnostic use under the FDA EUA for laboratories  certified under CLIA to perform high complexity testing. This test has not been  FDA cleared or approved.  A negative result does not rule out the presence of PCR inhibitors in the  specimen or target RNA in concentration below the limit of detection for the  assay. The possibility of a false negative should be considered if the  patient's recent exposure or clinical presentation suggests COVID-19.  This test was validated by the [facility] Infectious Diseases Diagnostic  Laboratory. This laboratory is certified under the Clinical Laboratory  Improvement Amendments of 1988 (CLIA-88) as qu                           alified to perform high  complexity laboratory testing.    Performed and/or entered by:  [facility] LAB[facility]  [address] [place] [address]             PHYSICAL EXAM:  Objective    Ht 1.651 m (5' 5\")   Wt 74.8 kg (165 lb)   BMI 27.46 kg/m      Vitals - Patient Reported  Pain Score: No Pain (0)        GENERAL: Healthy, alert and no distress  EYES: Eyes grossly normal to inspection.  No discharge or erythema, or obvious scleral/conjunctival abnormalities.  RESP: No audible wheeze, cough, or visible cyanosis.  No visible retractions or increased work of breathing.    SKIN: Visible skin clear. No significant rash, abnormal pigmentation or lesions.  NEURO: Cranial nerves grossly intact.  Mentation and speech appropriate for age.  PSYCH: Mentation appears normal, affect normal/bright, judgement and " insight intact, normal speech and appearance well-groomed.        Sincerely,    Sandhya Stein PA-C      10 minutes spent by me on the date of the encounter doing chart review, history and exam, documentation and further activities per the note    Virtual Visit Details    Type of service:  Video Visit     Originating Location (pt. Location): Home    Distant Location (provider location):  Off-site  Platform used for Video Visit: [employer]

## 2023-12-27 NOTE — LETTER
2023       RE: Marisel Jarrett  [address] [place] [address]     Dear Colleague,    Thank you for referring your patient, Marisel Jarrett, to the [facility] WEIGHT MANAGEMENT CLINIC [place] at [facility]. Please see a copy of my visit note below.      Return Medical Weight Management Note     Marisel Jarrett  MRN:  [id number]  :  1975  SUSAN:  2023    Dear Bebe Palmer MD,    I had the pleasure of seeing your patient Marisel Jarrett. She is a 48 year old female who I am continuing to see for treatment of obesity related to:        2022    12:50 PM   --   I have the following health issues associated with obesity None of the above   I have the following symptoms associated with obesity Back Pain       Assessment & Plan  Problem List Items Addressed This Visit       Overweight (BMI 25.0-29.9) - Primary    Relevant Medications    tirzepatide-Weight Management (ZEPBOUND) 7.5 MG/0.5ML prefilled pen    Semaglutide-Weight Management (WEGOVY) 2.4 MG/0.75ML pen     Other Visit Diagnoses       Bilateral low back pain with right-sided sciatica, unspecified chronicity        Relevant Medications    tirzepatide-Weight Management (ZEPBOUND) 7.5 MG/0.5ML prefilled pen           Weight mgmt follow up    -still taking wegovy  -weight down from highest 200 to 165 today. Up 7 lbs since last visit  -interested in seeing if zepbound covered, will send 7.5mg  -send wegovy 2.4mg one month so no gap in treatment  -follow up 3-4 mo Sandhya       INTERVAL HISTORY:      10/12/22:  -Increased wegovy to 2.4mg  -had lost from 180 to 168  -added running and intermittent fasting     3/27/23 (last visit)  -doing well on wegovy 2.4mg weekly  -lost 10 lbs since last visit  -weight is stable  -feels great, active    23 (today)  -still taking wegovy  -weight down from highest 200 to 165 today. Up 7 lbs since last  visit  -interested in seeing if zepbound covered, will send 7.5mg  -send wegovy 2.4mg one month so no gap in treatment  -follow up 3-4 mo Sandhya       Anti-obesity medication history    Current:   Wegovy 2.4mg    CURRENT WEIGHT:   165 lbs 0 oz    Highest in life 200 lbs prior to starting saxenda.   Initial Weight (lbs): 180 lbs  Last Visits Weight: 71.7 kg (158 lb)  Cumulative weight loss (lbs): 15  Weight Loss Percentage: 8.33%        12/27/2023    10:18 AM   Changes and Difficulties   I have made the following changes to my diet since my last visit: [place]   With regards to my diet, I am still struggling with: My weight has flatlined   I have made the following changes to my activity/exercise since my last visit: None   With regards to my activity/exercise, I am still struggling with: [place] weights in         MEDICATIONS:   Current Outpatient Medications   Medication Sig Dispense Refill    FLUoxetine (PROZAC) 40 MG capsule TAKE 1 CAPSULE BY MOUTH EVERY DAY 90 capsule 3    hydrOXYzine (VISTARIL) 50 MG capsule Take 1 capsule (50 mg) by mouth nightly as needed for itching 90 capsule 3    insulin pen needle (B-D U/F) 31G X 5 MM miscellaneous USE 1 PEN NEEDLES DAILY OR AS DIRECTED. 100 each 0    propranolol ER (INDERAL LA) 60 MG 24 hr capsule TAKE 1 CAPSULE BY MOUTH EVERY DAY 90 capsule 4    rizatriptan (MAXALT-MLT) 5 MG ODT TAKE 1-2 TABLETS (5-10 MG) BY MOUTH AT ONSET OF HEADACHE FOR MIGRAINE (MAY REPEAT IN 2 HOURS AS NEEDED. MAX 30 MG IN 24 HOURS) 12 tablet 0    Semaglutide-Weight Management (WEGOVY) 2.4 MG/0.75ML pen Inject 2.4 mg Subcutaneous once a week 3 mL 0    tirzepatide-Weight Management (ZEPBOUND) 7.5 MG/0.5ML prefilled pen Inject 0.5 mLs (7.5 mg) Subcutaneous every 7 days 2 mL 3           12/27/2023    10:18 AM   Weight Loss Medication History Reviewed With Patient   Which weight loss medications are you currently taking on a regular basis? Wegovy   Are you having any side effects from the weight loss  "medication that we have prescribed you? No       Ambulatory - [facility] on 01/22/2021   Component Date Value Ref Range Status    SARS-CoV-2 Virus Specimen Source 01/22/2021 Nasopharyngeal   Final    SARS-CoV-2 PCR Result 01/22/2021 NEGATIVE   Final    SARS-CoV2 (COVID-19) RNA not detected, presumed negative.    SARS-CoV-2 PCR Comment 01/22/2021 Testing was performed using the Aptima SARS-CoV-2 Assay on the [place]   Final    Comment: Instrument System. Additional information about this Emergency Use  Authorization (EUA) assay can be found via the Lab Guide.  This test should be ordered for the detection of SARS-CoV-2 in individuals who  meet SARS-CoV-2 clinical and/or epidemiological criteria. Test performance is  unknown in asymptomatic patients.  This test is for in vitro diagnostic use under the FDA EUA for laboratories  certified under CLIA to perform high complexity testing. This test has not been  FDA cleared or approved.  A negative result does not rule out the presence of PCR inhibitors in the  specimen or target RNA in concentration below the limit of detection for the  assay. The possibility of a false negative should be considered if the  patient's recent exposure or clinical presentation suggests COVID-19.  This test was validated by the [facility] Infectious Diseases Diagnostic  Laboratory. This laboratory is certified under the Clinical Laboratory  Improvement Amendments of 1988 (CLIA-88) as qu                           alified to perform high  complexity laboratory testing.    Performed and/or entered by:  [facility]  [address] [place] [address]             PHYSICAL EXAM:  Objective   Ht 1.651 m (5' 5\")   Wt 74.8 kg (165 lb)   BMI 27.46 kg/m      Vitals - Patient Reported  Pain Score: No Pain (0)        GENERAL: Healthy, alert and no distress  EYES: Eyes grossly normal to inspection.  No discharge or erythema, or obvious scleral/conjunctival " abnormalities.  RESP: No audible wheeze, cough, or visible cyanosis.  No visible retractions or increased work of breathing.    SKIN: Visible skin clear. No significant rash, abnormal pigmentation or lesions.  NEURO: Cranial nerves grossly intact.  Mentation and speech appropriate for age.  PSYCH: Mentation appears normal, affect normal/bright, judgement and insight intact, normal speech and appearance well-groomed.        Sincerely,    Sandhya Stein PA-C      10 minutes spent by me on the date of the encounter doing chart review, history and exam, documentation and further activities per the note    Virtual Visit Details    Type of service:  Video Visit     Originating Location (pt. Location): Home    Distant Location (provider location):  Off-site  Platform used for Video Visit: [employer]

## 2023-12-27 NOTE — TELEPHONE ENCOUNTER
PA Initiation    Medication: ZEPBOUND 7.5 MG/0.5ML SC SOAJ  Insurance Company: [employer] [place] - Phone [phone] Fax [phone]  Pharmacy Filling the Rx: [facility]/PHARMACY #[id number] - [place], [place] - [address]  Filling Pharmacy Phone: [phone]  Filling Pharmacy Fax: [phone]  Start Date: 12/27/2023         Thank you,     Compa Randall [facility]  Pharmacy Clinic [facility]  [email or web address]   Phone: [phone]  Fax: [phone]

## 2023-12-28 NOTE — TELEPHONE ENCOUNTER
Prior Authorization Approval    Medication: ZEPBOUND 7.5 MG/0.5ML SC SOAJ  Authorization Effective Date: 12/28/2023  Authorization Expiration Date: 12/28/2024  Approved Dose/Quantity: 2 ml per 28 days  Reference #: [id number]   Insurance Company: [employer] [place] - Phone [phone] Fax [phone]  Expected CoPay: $ 0  CoPay Card Available:      Financial Assistance Needed: No  Which Pharmacy is filling the prescription: [facility]/PHARMACY #[id number] - [place], [place] - [address] [place] [address]  Pharmacy Notified: Yes  Patient Notified: Yes - via MyChart         Thank you,     Compa Randall [facility]  [facility]arm[facility] Clinic [email or web address]   Phone: [phone]  Fax: [phone]

## 2023-12-29 RX ORDER — SEMAGLUTIDE 2.4 MG/.75ML
2.4 INJECTION, SOLUTION SUBCUTANEOUS WEEKLY
OUTPATIENT
Start: 2023-12-29

## 2024-01-11 NOTE — LETTER
1/11/2022       RE: Marisel Jarrett  [address] [place] [address]     Dear Colleague,    Thank you for referring your patient, Marisel Jarrett, to the [facility] NEUROLOGY CLINIC [place] at [facility]. Please see a copy of my visit note below.    Last Patient-Answered HIT-6 Questionnaire  HIT-6 1/10/2022   When you have headaches, how often is the pain severe 13   How often do headaches limit your ability to do usual daily activities including household work, work, school, or social activities? 11   When you have a headache, how often do you wish you could lie down? 13   In the past 4 weeks, how often have you felt too tired to do work or daily activities because of your headaches 11   In the past 4 weeks, how often have you felt fed up or irritated because of your headaches 11   In the past 4 weeks, how often did headaches limit your ability to concentrate on work or daily activities 11   HIT-6 Total Score 70       MIGRAINE DISABILITY ASSESSMENT (MIDAS)    On how many days in the last 3 months did you miss work or school because of your headaches?  24    How many days in the last 3 months was your productivity at work or school reduced by half or more because of your headaches? (Do not include days you counted in question 1 where you missed work or school.)  36    On how many days in the last 3 months did you not do household work (such as housework, home repairs and maintenance, shopping, caring for children and relatives) because of your headaches?  24    How many days in the last 3 months was your productivity in household work reduced by half or more because of your headaches? (Do not include days you counted in question 3 where you did not do household work).  36    On how many days in the last 3 months did you miss family, social, or lesiure activities because of your headaches?  6    MIDAS Total Score:     On how many days in  the last 3 months did you have a headache? (If a headache lasted more than 1 day, count each day.)   24    On a scale of 0 - 10, on average how painful were these headaches (where 0 = no pain at all, and 10 = pain as bad as it can be.)  8    Has been using propranolol and has been causing a heart burn. Has been taking 20 mg at bedtime and was causing a horrible heart burn. No other side effects.   Headache wise better -propranolol helps the headaches.     May try propranolol with dinner and a few hours before bedtime or a trial of propranolol ER 60 mg at bedtime or earlier if tolerated  Rizatriptan seems to work and no heavy feeling as with sumatriptan.     Patient is on prozac and wellbutrin   Headaches are still about 15 per month     If propranolol were not tolerated than we could try monthly CGRPs Emgality vs Botox     Follow up in 6-8 weeks or sooner if needed     PMH, allergies and current prescription medications reviewed    Patient is alert and no in apparent acute distress,  mentation appears normal, judgement and insight intact, normal speech.    I discussed all my recommendations with Marisel Jarrett who verbalizes understanding and comfortable with the plan.  All of patient's questions were answered from the best of my knowledge.  Patient is in agreement with the plan.     16 minutes spent on the date of the encounter doing video access,  meds review, treatment plan, documentation and further activities as noted above      MATTI Hudson, CNP [facility]  Headache certified  [facility]   Yes

## 2024-01-18 ENCOUNTER — VIRTUAL VISIT (OUTPATIENT)
Dept: NEUROLOGY | Facility: CLINIC | Age: 49
End: 2024-01-18
Payer: COMMERCIAL

## 2024-01-18 VITALS — WEIGHT: 158 LBS | BODY MASS INDEX: 26.33 KG/M2 | HEIGHT: 65 IN

## 2024-01-18 DIAGNOSIS — G43.101 MIGRAINE WITH AURA AND WITH STATUS MIGRAINOSUS, NOT INTRACTABLE: Primary | ICD-10-CM

## 2024-01-18 PROCEDURE — 99212 OFFICE O/P EST SF 10 MIN: CPT | Mod: 95 | Performed by: NURSE PRACTITIONER

## 2024-01-18 RX ORDER — NARATRIPTAN 2.5 MG/1
2.5 TABLET ORAL
Qty: 18 TABLET | Refills: 11 | Status: SHIPPED | OUTPATIENT
Start: 2024-01-18 | End: 2024-09-19

## 2024-01-18 ASSESSMENT — HEADACHE IMPACT TEST (HIT 6)
WHEN YOU HAVE A HEADACHE HOW OFTEN DO YOU WISH YOU COULD LIE DOWN: ALWAYS
HOW OFTEN HAVE YOU FELT FED UP OR IRRITATED BECAUSE OF YOUR HEADACHES: SOMETIMES
HOW OFTEN HAVE YOU FELT TOO TIRED TO WORK BECAUSE OF YOUR HEADACHES: SOMETIMES
WHEN YOU HAVE HEADACHES HOW OFTEN IS THE PAIN SEVERE: SOMETIMES
HOW OFTEN DID HEADACHS LIMIT CONCENTRATION ON WORK OR DAILY ACTIVITY: ALWAYS
HOW OFTEN DID HEADACHS LIMIT CONCENTRATION ON WORK OR DAILY ACTIVITY: ALWAYS
HOW OFTEN DO HEADACHES LIMIT YOUR DAILY ACTIVITIES: SOMETIMES
WHEN YOU HAVE A HEADACHE HOW OFTEN DO YOU WISH YOU COULD LIE DOWN: ALWAYS
WHEN YOU HAVE HEADACHES HOW OFTEN IS THE PAIN SEVERE: SOMETIMES
HOW OFTEN HAVE YOU FELT FED UP OR IRRITATED BECAUSE OF YOUR HEADACHES: SOMETIMES
HOW OFTEN DO HEADACHES LIMIT YOUR DAILY ACTIVITIES: SOMETIMES
HIT6 TOTAL SCORE: 66
HIT6 TOTAL SCORE: 66
HOW OFTEN HAVE YOU FELT TOO TIRED TO WORK BECAUSE OF YOUR HEADACHES: SOMETIMES

## 2024-01-18 ASSESSMENT — MIGRAINE DISABILITY ASSESSMENT (MIDAS)
HOW MANY DAYS DID YOU NOT DO HOUSEWORK BECAUSE OF HEADACHES: 6
HOW OFTEN WERE SOCIAL ACTIVITIES MISSED DUE TO HEADACHES: 0
HOW MANY DAYS IN THE PAST 3 MONTHS HAVE YOU HAD A HEADACHE: 6
HOW MANY DAYS WAS YOUR PRODUCTIVITY CUT IN HALF BECAUSE OF HEADACHES: 0
HOW MANY DAYS WAS HOUSEWORK PRODUCTIVITY CUT IN HALF DUE TO HEADACHES: 0
HOW MANY DAYS DID YOU MISS WORK OR SCHOOL BECAUSE OF HEADACHES: 5
TOTAL SCORE: 11
ON A SCALE FROM 0-10 ON AVERAGE HOW PAINFUL WERE HEADACHES: 6

## 2024-01-18 ASSESSMENT — PAIN SCALES - GENERAL: PAINLEVEL: NO PAIN (0)

## 2024-01-18 NOTE — NURSING NOTE
Is the patient currently in the state of [place]? YES    Visit mode:VIDEO    If the visit is dropped, the patient can be reconnected by: VIDEO VISIT: Text to cell phone:   Telephone Information:   Mobile [phone]       Will anyone else be joining the visit? NO  (If patient encounters technical issues they should call [phone]    How would you like to obtain your AVS? MyChart    Are changes needed to the allergy or medication list? No    Reason for visit: Follow Up    Eden BROWN

## 2024-01-18 NOTE — PROGRESS NOTES
Marisel is a 48 year old who is being evaluated via a billable video visit.  Telephone       Subjective   Marisel is a 48 year old, presenting for the following health issues:headache follow up   Follow Up    Last visit virtual 4/6/2022, see note for details  Everything is going well but wonders if any other rizatriptan might work better. It takes about 24 hours for headache to resolve but make it manageable.   Headaches about a couple per month with propranolol. May have 3 in the week and than fine.   Propranolol 60 mg ER and controlling headaches and headaches not extreme. No side effects but sometimes heart burn but coincide with another medication and needs to take 30 min before laying down.   Sumatriptan took too long to work and had to take a couple of them.     Plan:  Continue propranolol 60 mg ER for migraine prevention   Rescue treatment -stop rizatriptan   A trial of naratriptan for rescue treatment and side effects -as reviewed -see after visit summary for details  Follow up in a year if stable or sooner if needed     Objective    Vitals - Patient Reported  Pain Score: No Pain (0)    Physical Exam   Patient is alert and no in apparent acute distress,  mentation appears normal, judgement and insight intact, normal speech.    I discussed all my recommendations with Marisel Jarrett who verbalizes understanding and comfortable with the plan.   19 minutes spent on the date of the encounter doing video/telephone access, chart  review, meds review, treatment plan, documentation and further activities as noted above    MATTI Hudson, CNP [facility]  Headache certified  [facility]    Video-Visit Details    Type of service:  Video Visit /telephone -due to video sound issues  Originating Location (pt. Location): Home  Distant Location (provider location):  Off-site  Platform used for Video Visit: [place]  Signed Electronically by: MATTI Gavin CNP

## 2024-01-18 NOTE — LETTER
1/18/2024       RE: Marisel Jarrett  [address] [place] [address]       Dear Colleague,    Thank you for referring your patient, Marisel Jarrett, to the [facility] NEUROLOGY CLINIC [place] at [facility]. Please see a copy of my visit note below.    Marisel is a 48 year old who is being evaluated via a billable video visit.  Telephone       Subjective  Marisel is a 48 year old, presenting for the following health issues:headache follow up   Follow Up    Last visit virtual 4/6/2022, see note for details  Everything is going well but wonders if any other rizatriptan might work better. It takes about 24 hours for headache to resolve but make it manageable.   Headaches about a couple per month with propranolol. May have 3 in the week and than fine.   Propranolol 60 mg ER and controlling headaches and headaches not extreme. No side effects but sometimes heart burn but coincide with another medication and needs to take 30 min before laying down.   Sumatriptan took too long to work and had to take a couple of them.     Plan:  Continue propranolol 60 mg ER for migraine prevention   Rescue treatment -stop rizatriptan   A trial of naratriptan for rescue treatment and side effects -as reviewed -see after visit summary for details  Follow up in a year if stable or sooner if needed     Objective   Vitals - Patient Reported  Pain Score: No Pain (0)    Physical Exam   Patient is alert and no in apparent acute distress,  mentation appears normal, judgement and insight intact, normal speech.    I discussed all my recommendations with Marisel Jarrett who verbalizes understanding and comfortable with the plan.   19 minutes spent on the date of the encounter doing video/telephone access, chart  review, meds review, treatment plan, documentation and further activities as noted above          Again, thank you for allowing me to participate in the  care of your patient.      Sincerely,    MATTI Gavin CNP

## 2024-03-27 ENCOUNTER — VIRTUAL VISIT (OUTPATIENT)
Dept: ENDOCRINOLOGY | Facility: CLINIC | Age: 49
End: 2024-03-27
Payer: COMMERCIAL

## 2024-03-27 VITALS — BODY MASS INDEX: 25.83 KG/M2 | WEIGHT: 155 LBS | HEIGHT: 65 IN

## 2024-03-27 DIAGNOSIS — M54.41 BILATERAL LOW BACK PAIN WITH RIGHT-SIDED SCIATICA, UNSPECIFIED CHRONICITY: ICD-10-CM

## 2024-03-27 DIAGNOSIS — E66.3 OVERWEIGHT (BMI 25.0-29.9): Primary | ICD-10-CM

## 2024-03-27 PROCEDURE — 99212 OFFICE O/P EST SF 10 MIN: CPT | Mod: 95 | Performed by: PHYSICIAN ASSISTANT

## 2024-03-27 ASSESSMENT — PAIN SCALES - GENERAL: PAINLEVEL: NO PAIN (0)

## 2024-03-27 NOTE — NURSING NOTE
Is the patient currently in the state of [place]? YES    Visit mode:VIDEO    If the visit is dropped, the patient can be reconnected by: VIDEO VISIT: Text to cell phone:   Telephone Information:   Mobile [phone]       Will anyone else be joining the visit? NO  (If patient encounters technical issues they should call [phone]    How would you like to obtain your AVS? MyChart    Are changes needed to the allergy or medication list? No, Pt stated no changes to allergies, and Pt stated no med changes    Reason for visit: RECHECK ([facility])    Regla Robert VVF    Pt stated she feels like she loses weight right away when increasing injectable dose, then plateaus until next increase.

## 2024-03-27 NOTE — PROGRESS NOTES
Return Medical Weight Management Note     Marisel Jarrett  MRN:  [id number]  :  1975  SUSAN:  3/27/2024    Dear Bebe Palmer MD,    I had the pleasure of seeing your patient Marisel Jarrett. She is a 48 year old female who I am continuing to see for treatment of obesity related to:        2022    12:50 PM   --   I have the following health issues associated with obesity None of the above   I have the following symptoms associated with obesity Back Pain       Assessment & Plan   Problem List Items Addressed This Visit       Overweight (BMI 25.0-29.9) - Primary    Relevant Orders    Med Therapy Management Referral     Other Visit Diagnoses       Bilateral low back pain with right-sided sciatica, unspecified chronicity        Relevant Orders    Med Therapy Management Referral           Weight mgmt follow up  Has lost from 180 to 155 in total.    Doing well on zepbound 7.5 (changed from wegovy)  Weight starting to increase slightly and wants to increase dose    Increase zepbound to 10mg weekly    MTM 6 weeks phone to follow up zepbound increase  Sandhya 4-5 mo return MWM    INTERVAL HISTORY:    Doing well on zepbound 7.5mg       Anti-obesity medication history    Current:   Zepbound 7.5mg      Recent exercise/activity changes: Playing more tennis with weight loss    CURRENT WEIGHT:   155 lbs 0 oz    Highest wt in life: 185 lbs  Initial Weight (lbs): 180 lbs  Last Visits Weight: 74.8 kg (165 lb)  Cumulative weight loss (lbs): 25  Weight Loss Percentage: 13.89%        3/27/2024    11:19 AM   Changes and Difficulties   I have made the following changes to my diet since my last visit: None, has been consistent   With regards to my diet, I am still struggling with: None   I have made the following changes to my activity/exercise since my last visit: None   With regards to my activity/exercise, I am still struggling with: None         MEDICATIONS:   Current Outpatient Medications   Medication Sig  Dispense Refill    FLUoxetine (PROZAC) 40 MG capsule TAKE 1 CAPSULE BY MOUTH EVERY DAY 90 capsule 3    hydrOXYzine (VISTARIL) 50 MG capsule Take 1 capsule (50 mg) by mouth nightly as needed for itching 90 capsule 3    naratriptan (AMERGE) 2.5 MG tablet Take 1 tablet (2.5 mg) by mouth at onset of headache for migraine May repeat in 4 hours. Max 2 tablets/24 hours. 18 tablet 11    propranolol ER (INDERAL LA) 60 MG 24 hr capsule TAKE 1 CAPSULE BY MOUTH EVERY DAY 90 capsule 4    rizatriptan (MAXALT-MLT) 5 MG ODT TAKE 1-2 TABLETS (5-10 MG) BY MOUTH AT ONSET OF HEADACHE FOR MIGRAINE (MAY REPEAT IN 2 HOURS AS NEEDED. MAX 30 MG IN 24 HOURS) 12 tablet 0           3/27/2024    11:19 AM   Weight Loss Medication History Reviewed With Patient   Are you having any side effects from the weight loss medication that we have prescribed you? Yes   If you are having side effects please describe: Little bit of diarrhea       Ambulatory - [facility] on 01/22/2021   Component Date Value Ref Range Status    SARS-CoV-2 Virus Specimen Source 01/22/2021 Nasopharyngeal   Final    SARS-CoV-2 PCR Result 01/22/2021 NEGATIVE   Final    SARS-CoV2 (COVID-19) RNA not detected, presumed negative.    SARS-CoV-2 PCR Comment 01/22/2021 Testing was performed using the Aptima SARS-CoV-2 Assay on the [place]   Final    Comment: Instrument System. Additional information about this Emergency Use  Authorization (EUA) assay can be found via the Lab Guide.  This test should be ordered for the detection of SARS-CoV-2 in individuals who  meet SARS-CoV-2 clinical and/or epidemiological criteria. Test performance is  unknown in asymptomatic patients.  This test is for in vitro diagnostic use under the FDA EUA for laboratories  certified under CLIA to perform high complexity testing. This test has not been  FDA cleared or approved.  A negative result does not rule out the presence of PCR inhibitors in the  specimen or target RNA in concentration below the limit of  "detection for the  assay. The possibility of a false negative should be considered if the  patient's recent exposure or clinical presentation suggests COVID-19.  This test was validated by the [facility] Infectious Diseases Diagnostic  Laboratory. This laboratory is certified under the Clinical Laboratory  Improvement Amendments of 1988 (CLIA-88) as qu                           alified to perform high  complexity laboratory testing.    Performed and/or entered by:  [facility]  [address] [place] [address]       PHYSICAL EXAM:  Objective    Ht 1.651 m (5' 5\")   Wt 70.3 kg (155 lb)   BMI 25.79 kg/m      Vitals - Patient Reported  Pain Score: No Pain (0)        GENERAL: alert and no distress  EYES: Eyes grossly normal to inspection.  No discharge or erythema, or obvious scleral/conjunctival abnormalities.  RESP: No audible wheeze, cough, or visible cyanosis.    SKIN: Visible skin clear. No significant rash, abnormal pigmentation or lesions.  NEURO: Cranial nerves grossly intact.  Mentation and speech appropriate for age.  PSYCH: Appropriate affect, tone, and pace of words        Sincerely,    Sandhya Stein PA-C      10 minutes spent by me on the date of the encounter doing chart review, history and exam, documentation and further activities per the note    Virtual Visit Details    Type of service:  Video Visit     Originating Location (pt. Location): Home    Distant Location (provider location):  Off-site  Platform used for Video Visit: Paula  "

## 2024-03-27 NOTE — LETTER
3/27/2024       RE: Marisel Jarrett  [address] [place] [address]     Dear Colleague,    Thank you for referring your patient, Marisel Jarrett, to the [facility]NAGEMENT [facility] [place] at [facility]. Please see a copy of my visit note below.      Return Medical Weight Management Note     Marisel Jarrett  MRN:  [id number]  :  1975  SUSAN:  3/27/2024    Dear Bebe Palmer MD,    I had the pleasure of seeing your patient Marisel Jarrett. She is a 48 year old female who I am continuing to see for treatment of obesity related to:        2022    12:50 PM   --   I have the following health issues associated with obesity None of the above   I have the following symptoms associated with obesity Back Pain       Assessment & Plan  Problem List Items Addressed This Visit       Overweight (BMI 25.0-29.9) - Primary    Relevant Orders    Med Therapy Management Referral     Other Visit Diagnoses       Bilateral low back pain with right-sided sciatica, unspecified chronicity        Relevant Orders    Med Therapy Management Referral           Weight mgmt follow up  Has lost from 180 to 155 in total.    Doing well on zepbound 7.5 (changed from wegovy)  Weight starting to increase slightly and wants to increase dose    Increase zepbound to 10mg weekly    MTM 6 weeks phone to follow up zepbound increase  Sandhya 4-5 mo return MWM    INTERVAL HISTORY:    Doing well on zepbound 7.5mg       Anti-obesity medication history    Current:   Zepbound 7.5mg      Recent exercise/activity changes: Playing more tennis with weight loss    CURRENT WEIGHT:   155 lbs 0 oz    Highest wt in life: 185 lbs  Initial Weight (lbs): 180 lbs  Last Visits Weight: 74.8 kg (165 lb)  Cumulative weight loss (lbs): 25  Weight Loss Percentage: 13.89%        3/27/2024    11:19 AM   Changes and Difficulties   I have made the following changes to my diet  since my last visit: None, has been consistent   With regards to my diet, I am still struggling with: None   I have made the following changes to my activity/exercise since my last visit: None   With regards to my activity/exercise, I am still struggling with: None         MEDICATIONS:   Current Outpatient Medications   Medication Sig Dispense Refill    FLUoxetine (PROZAC) 40 MG capsule TAKE 1 CAPSULE BY MOUTH EVERY DAY 90 capsule 3    hydrOXYzine (VISTARIL) 50 MG capsule Take 1 capsule (50 mg) by mouth nightly as needed for itching 90 capsule 3    naratriptan (AMERGE) 2.5 MG tablet Take 1 tablet (2.5 mg) by mouth at onset of headache for migraine May repeat in 4 hours. Max 2 tablets/24 hours. 18 tablet 11    propranolol ER (INDERAL LA) 60 MG 24 hr capsule TAKE 1 CAPSULE BY MOUTH EVERY DAY 90 capsule 4    rizatriptan (MAXALT-MLT) 5 MG ODT TAKE 1-2 TABLETS (5-10 MG) BY MOUTH AT ONSET OF HEADACHE FOR MIGRAINE (MAY REPEAT IN 2 HOURS AS NEEDED. MAX 30 MG IN 24 HOURS) 12 tablet 0           3/27/2024    11:19 AM   Weight Loss Medication History Reviewed With Patient   Are you having any side effects from the weight loss medication that we have prescribed you? Yes   If you are having side effects please describe: Little bit of diarrhea       Ambulatory - [facility] on 01/22/2021   Component Date Value Ref Range Status    SARS-CoV-2 Virus Specimen Source 01/22/2021 Nasopharyngeal   Final    SARS-CoV-2 PCR Result 01/22/2021 NEGATIVE   Final    SARS-CoV2 (COVID-19) RNA not detected, presumed negative.    SARS-CoV-2 PCR Comment 01/22/2021 Testing was performed using the Aptima SARS-CoV-2 Assay on the [employer]   Final    Comment: Instrument System. Additional information about this Emergency Use  Authorization (EUA) assay can be found via the Lab Guide.  This test should be ordered for the detection of SARS-CoV-2 in individuals who  meet SARS-CoV-2 clinical and/or epidemiological criteria. Test performance is  unknown in  "asymptomatic patients.  This test is for in vitro diagnostic use under the FDA EUA for laboratories  certified under CLIA to perform high complexity testing. This test has not been  FDA cleared or approved.  A negative result does not rule out the presence of PCR inhibitors in the  specimen or target RNA in concentration below the limit of detection for the  assay. The possibility of a false negative should be considered if the  patient's recent exposure or clinical presentation suggests COVID-19.  This test was validated by the [facility] Infectious Diseases Diagnostic  Laboratory. This laboratory is certified under the Clinical Laboratory  Improvement Amendments of 1988 (CLIA-88) as qu                           alified to perform high  complexity laboratory testing.    Performed and/or entered by:  [facility]  [address] [place] [address]       PHYSICAL EXAM:  Objective   Ht 1.651 m (5' 5\")   Wt 70.3 kg (155 lb)   BMI 25.79 kg/m      Vitals - Patient Reported  Pain Score: No Pain (0)        GENERAL: alert and no distress  EYES: Eyes grossly normal to inspection.  No discharge or erythema, or obvious scleral/conjunctival abnormalities.  RESP: No audible wheeze, cough, or visible cyanosis.    SKIN: Visible skin clear. No significant rash, abnormal pigmentation or lesions.  NEURO: Cranial nerves grossly intact.  Mentation and speech appropriate for age.  PSYCH: Appropriate affect, tone, and pace of words        Sincerely,    Sandhya Stein PA-C      10 minutes spent by me on the date of the encounter doing chart review, history and exam, documentation and further activities per the note    Virtual Visit Details    Type of service:  Video Visit     Originating Location (pt. Location): Home    Distant Location (provider location):  Off-site  Platform used for Video Visit: Paula"

## 2024-03-28 ENCOUNTER — MYC MEDICAL ADVICE (OUTPATIENT)
Dept: ENDOCRINOLOGY | Facility: CLINIC | Age: 49
End: 2024-03-28
Payer: COMMERCIAL

## 2024-03-28 DIAGNOSIS — E66.3 OVERWEIGHT (BMI 25.0-29.9): Primary | ICD-10-CM

## 2024-04-02 ENCOUNTER — TELEPHONE (OUTPATIENT)
Dept: ENDOCRINOLOGY | Facility: CLINIC | Age: 49
End: 2024-04-02
Payer: COMMERCIAL

## 2024-04-02 NOTE — TELEPHONE ENCOUNTER
Left Voicemail (1st Attempt) and Sent Mychart (1st Attempt) for the patient to call back and schedule the following:    Appointment type: RET BREE   Provider: Sandhya Stein PA-C   Return date: 4- 5 mos ( July/Aug 2024 )   Specialty phone number: [phone]  Additional appointment(s) needed: no   Additonal Notes: no

## 2024-04-10 ENCOUNTER — MYC MEDICAL ADVICE (OUTPATIENT)
Dept: ENDOCRINOLOGY | Facility: CLINIC | Age: 49
End: 2024-04-10
Payer: COMMERCIAL

## 2024-04-22 ENCOUNTER — TELEPHONE (OUTPATIENT)
Dept: SURGERY | Facility: CLINIC | Age: 49
End: 2024-04-22
Payer: COMMERCIAL

## 2024-04-22 DIAGNOSIS — E66.3 OVERWEIGHT (BMI 25.0-29.9): ICD-10-CM

## 2024-04-22 NOTE — TELEPHONE ENCOUNTER
Pt. would like her prescritpion for Zepbound sent to the [facility] mail order [facility] ASAP.

## 2024-04-22 NOTE — TELEPHONE ENCOUNTER
tirzepatide-Weight Management (ZEPBOUND) 10 MG/0.5ML prefilled pen 2 mL 3 4/3/2024   Remaining refills sent to requested pharmacy.

## 2024-05-09 ENCOUNTER — VIRTUAL VISIT (OUTPATIENT)
Dept: CARDIOLOGY | Facility: CLINIC | Age: 49
End: 2024-05-09
Attending: PHYSICIAN ASSISTANT
Payer: COMMERCIAL

## 2024-05-09 VITALS — WEIGHT: 160 LBS | BODY MASS INDEX: 26.66 KG/M2 | HEIGHT: 65 IN

## 2024-05-09 DIAGNOSIS — E66.3 OVERWEIGHT (BMI 25.0-29.9): Primary | ICD-10-CM

## 2024-05-09 DIAGNOSIS — G43.911 INTRACTABLE MIGRAINE WITH STATUS MIGRAINOSUS, UNSPECIFIED MIGRAINE TYPE: ICD-10-CM

## 2024-05-09 DIAGNOSIS — F33.42 RECURRENT MAJOR DEPRESSIVE DISORDER, IN FULL REMISSION (H): ICD-10-CM

## 2024-05-09 DIAGNOSIS — J30.2 SEASONAL ALLERGIC RHINITIS, UNSPECIFIED TRIGGER: ICD-10-CM

## 2024-05-09 RX ORDER — PSEUDOEPHEDRINE HCL 30 MG
30 TABLET ORAL EVERY 4 HOURS PRN
COMMUNITY
End: 2024-09-19

## 2024-05-09 RX ORDER — CETIRIZINE HYDROCHLORIDE 10 MG/1
10 TABLET ORAL DAILY
COMMUNITY
Start: 2024-05-09

## 2024-05-09 RX ORDER — FLUTICASONE PROPIONATE 50 MCG
1 SPRAY, SUSPENSION (ML) NASAL DAILY
COMMUNITY
Start: 2024-05-09

## 2024-05-09 NOTE — PROGRESS NOTES
"Virtual Visit Details    Type of service:  Video Visit     Originating Location (pt. Location): {video visit patient location:932538::\"Home\"}  {PROVIDER LOCATION On-site should be selected for visits conducted from your clinic location or adjoining [facility] hospital, academic office, or other nearby [facility] building. Off-site should be selected for all other provider locations, including home:297371}  Distant Location (provider location):  {virtual location provider:572002}  Platform used for Video Visit: {Virtual Visit Platforms:913874::\"Dentalink\"}    "

## 2024-05-09 NOTE — NURSING NOTE
Is the patient currently in the state of [place]? YES    Visit mode:VIDEO    If the visit is dropped, the patient can be reconnected by: VIDEO VISIT: Text to cell phone:   Telephone Information:   Mobile [phone]       Will anyone else be joining the visit? NO  (If patient encounters technical issues they should call [phone])    How would you like to obtain your AVS? MyChart    Are changes needed to the allergy or medication list? Pt stated no changes to allergies and Pt stated no med changes    Reason for visit: Consult    Marisela TRANF

## 2024-05-09 NOTE — LETTER
5/9/2024      RE: Marisel Burgos  [address] [place] [address]       Dear Colleague,    Thank you for the opportunity to participate in the care of your patient, Marisel Burgos, at the [facility] HEART CLINIC [place] at [facility]. Please see a copy of my visit note below.    Medication Therapy Management (MTM) Encounter    ASSESSMENT:                            Medication Adherence/Access: sent both Zepbound 10mg and 12.5 mg doses to pharmacy to help with access due to shortages    Obesity   Overweight (BMI 27-29.9): Given patient weight slightly increased and tolerating well, recommend increase in Zepbound today. Discussed dietary and behavioral modifications.     Allergies: given hydroxyzine more helpful in managing allergy symptoms, and chronic use of pseudoephedrine not recommended and may increase headache symptoms, recommend start 2nd gen antihistamine +/- intranasal steroid to reduce allergy/itch symptoms.     Migraine: stable       Depression:stable     PLAN:                            Increase Zepbound to 12.5 mg weekly. I also sent an prescription for 10 mg you can use for another 4 weeks if the pharmacy is out of 12.5 mg Zepbound to prevent lapse in treatment.    To help with tolerability and efficacy of Zepbound :  Eat small meals/snacks throughout the day (about every 2 hours)  Focus on getting protein in first with each meal and snack (goal to start with is 60 g protein).   Work to increase non-carbonated water - goal 64 oz throughout the day  You may try Metamucil, Benefiber, or Citrucel to help feel more full (less nausea) and have softer, more consistent bowel movements.  To optimize weight management - work on incorporating resistance training/weight lifting to build muscle and improve overall metabolism of adipose tissue.    Stop daily pseudoephedrine use (may increase risk of headache and hypertension long term). Instead, start  cetirizine (Zyrtec) or loratadine (Claritin) once daily to help with allergies and itch. If eye, nose, ear symptoms are worse, you may also start Flonase nasal spray twice daily for severe allergy season - this is great as it gets right to the site of action, but may take a week to optimize, so using WITH Zyrtec or Claritin is recommended.     Follow-up: 3 months with Sandhya Stein PA-C (schedule now as she is booking well into the future). About 6 months or as needed with Abbey Palomares [facility]     SUBJECTIVE/OBJECTIVE:                          Marisel Burgos is a 48 year old female contacted via secure video for an initial visit. She was referred to me from Sandhya Stein PA-C .      Reason for visit: Medication Therapy Management - GLP1/GIP Management.    Allergies/ADRs: Reviewed in chart  Past Medical History: Reviewed in chart  Tobacco: She reports that she quit smoking about 13 years ago. Her smoking use included cigarettes. She started smoking about 30 years ago. She has quit using smokeless tobacco.  Alcohol: not currently using  Caffeine: 1-3 cups daily    Medication Adherence/Access: Medication barriers: obtaining medication from pharmacy.     Obesity  Overweight (BMI 27-29.9):   Zepbound 10 mg once weekly    Seen by Sandhya Stein PA-C 3/27/24 for Return weight management Visit    Patient reports no current medication side effects. Finds significant reduction in food noise and appetite with Zepbound. Somewhat frustrated that weight is at plateau seemingly - increase in Zepbound to 10mg not reducing weight. Would like to increase if possible. Wonders if has some insulnin resistance as has always had small portions, but difficult to lose weight.    Nutrition/Eating Habits: appetite reduced, does not feel hungry at breakfast, so often skips. Does not prioritize protein (currently about 1 meal per day). Drinks a lot of carbonated water, but not still water.      Exercise/Activity: working to increase thist -  "busy lifestyle.     Medication History:  No hx of pancreatitis. No hx of MEN or MTC (per Sandhya Stein PA-C note 4/4/22)    CURRENT WEIGHT:   160 lbs 0 oz     Highest wt in life: 185 lbs  Initial Weight (lbs): 180 lbs  Last Visits Weight: 74.8 kg (155 lb)  Cumulative weight loss (lbs): -25  Weight Loss Percentage: -13.5%        Wt Readings from Last 4 Encounters:   05/09/24 72.6 kg (160 lb)   03/27/24 70.3 kg (155 lb)   01/18/24 71.7 kg (158 lb)   12/27/23 74.8 kg (165 lb)     Estimated body mass index is 26.63 kg/m  as calculated from the following:    Height as of this encounter: 1.651 m (5' 5\").    Weight as of this encounter: 72.6 kg (160 lb).        Allergies:   Pseudoephedrine 30 mg - 1 tab every 6 hours as needed for congestion  Tylenol PM - 1 tab at bedtime as needed for allergy/itch    Patient had been taking hydroxyzine every night at bedtime for allergy and itch. Has not refilled as PCP has left [place] and hasn't re-established with new PCP. Found this better for itch/allergies and now using current regimen to manage symptoms - works ok, but not as well as hydroxyzine in the past.    Migraine:  Propranolol ER 60 mg once daily  Naratriptan 2.5 mg tab at onset of headache     Following with Neurology. Propranolol significantly improved migraine. Still occasionally has headache, naratriptan works ok. Denies side effects.    Depression:  Fluoxetine 40 mg once daily    Works well. Denies side effects.    Today's Vitals: Ht 1.651 m (5' 5\")   Wt 72.6 kg (160 lb)   Breastfeeding No   BMI 26.63 kg/m    ----------------      I spent 16 minutes with this patient today. All changes were made via collaborative practice agreement with Sandhya Setin. A copy of the visit note was provided to the patient's provider(s).    A summary of these recommendations was sent via [employer].    Abbey Palomares, Pharm D., MPH    Medication Therapy Management Pharmacist   [facility] Comprehensive Weight Management " Clinic      Telemedicine Visit Details  Type of service:  Video Conference via [employer]  Start Time:  8:10 AM  End Time:  8:26 AM     Medication Therapy Recommendations  Overweight (BMI 25.0-29.9)    Current Medication: tirzepatide-Weight Management (ZEPBOUND) 10 MG/0.5ML prefilled pen (Discontinued)   Rationale: Dose too low - Dosage too low - Effectiveness   Recommendation: Increase Dose   Status: Accepted per CPA         Seasonal allergic rhinitis, unspecified trigger    Current Medication: pseudoePHEDrine (SUDAFED) 30 MG tablet   Rationale: Treating avoidable adverse medication reaction - Unnecessary medication therapy - Indication   Recommendation: Change Medication   Status: Patient Agreed - Adherence/Education                Please do not hesitate to contact me if you have any questions/concerns.     Sincerely,     Abbey Palomares, [facility]

## 2024-05-09 NOTE — Clinical Note
Eliel Arthur - I met with Marisel today and her Zepbound results seem to be stalling (tolerating 10 mg well), but may also be secondary to low protein and non-carbonated water intake. So she is going to work on this and I sent prescription for 12.5mg as well to increase (also sent a 10 mg dose to help with shortage to give her some flexibility).  She hasn't scheduled with you yet for follow up - but I reminded her you are booking out a ways and she should get on your schedule for follow up. Nothing scheduled with me yet, but happy to see her as you two decide at follow up.  Abbey

## 2024-05-09 NOTE — PATIENT INSTRUCTIONS
Recommendations from MTM Pharmacist visit:                                                    MTM (medication therapy management) is a service provided by a clinical pharmacist designed to help you get the most of out of your medicines.  You may be sent a phone or email survey evaluating today's visit.  Please provide feedback you have for the service he received today if you are able.      Increase Zepbound to 12.5 mg weekly. I also sent an prescription for 10 mg you can use for another 4 weeks if the pharmacy is out of 12.5 mg Zepbound to prevent lapse in treatment.    To help with tolerability and efficacy of Zepbound :  Eat small meals/snacks throughout the day (about every 2 hours)  Focus on getting protein in first with each meal and snack (goal to start with is 60 g protein).   Work to increase non-carbonated water - goal 64 oz throughout the day  You may try Metamucil, Benefiber, or Citrucel to help feel more full (less nausea) and have softer, more consistent bowel movements.  To optimize weight management - work on incorporating resistance training/weight lifting to build muscle and improve overall metabolism of adipose tissue.    Meal Replacement Shake Options:   *Protein Shake Criteria: no more than 210 Calories, at least 20 grams of protein, and less than 10 grams of sugar   Premier Protein (160 Calories, 30 g protein)  Slim Fast Advanced Nutrition (180 Calories, 20 g protein)  Muscle Milk, lactose-free, 17 oz bottle (210 Calories, 30 g protein)  Integrated Supplements, no artificial sugars (110 Calories, 20 g protein)  Boost/Ensure Max (160 calories, 30 gm protein)   Fairlife Protein Shakes (160-230 calories, 26-42 gm protein)  Aldi's Elevation Protein Powder (180 calories, 30 gm protein)   Orgain Protein Shakes (130-160 calories, 20-26 gm protein)     Meal Replacement Bar Options:  Quest Protein Bars (190 Calories, 20 g protein)  Built Bar (170 Calories, 15-20 g protein)  One Protein Bar (210  "calories, 20 g protein)  [place] Signature Protein Bar ([employer]) (190 Calories, 21 g protein)  Pure Protein Bars (180 Calories, 21 g protein)    Low Calorie Frozen Meal:  Healthy Choice Power Bowls  Lean Cuisine  Smart Ones  Jan Stokes    Stop daily pseudoephedrine use (may increase risk of headache and hypertension long term). Instead, start cetirizine (Zyrtec) or loratadine (Claritin) once daily to help with allergies and itch. If eye, nose, ear symptoms are worse, you may also start Flonase nasal spray twice daily for severe allergy season - this is great as it gets right to the site of action, but may take a week to optimize, so using WITH Zyrtec or Claritin is recommended.     Follow-up: 3 months with Sandhya Stein PA-C (schedule now as she is booking well into the future). About 6 months or as needed with Abbey Palomares, [facility]         It was great speaking with you today.  I value your experience and would be very thankful for your time in providing feedback in our clinic survey. In the next few days, you may receive an email or text message from [facility] with a link to a survey related to your  clinical pharmacist.\"     To schedule another MTM appointment, please call the clinic directly ([facility] Phone Number: [phone] (schedules for [facility] and [facility] - providers, dietitians, health coaches) or you may call the MTM scheduling line at [phone] or toll-free at [phone].     My Clinical Pharmacist's contact information:                                                      Please feel free to contact me with any questions or concerns you have.      Abbey Palomares, Pharm D., MPH    Medication Therapy Management Pharmacist   [facility]           "

## 2024-05-09 NOTE — PROGRESS NOTES
Medication Therapy Management (MTM) Encounter    ASSESSMENT:                            Medication Adherence/Access: sent both Zepbound 10mg and 12.5 mg doses to pharmacy to help with access due to shortages    Obesity   Overweight (BMI 27-29.9): Given patient weight slightly increased and tolerating well, recommend increase in Zepbound today. Discussed dietary and behavioral modifications.     Allergies: given hydroxyzine more helpful in managing allergy symptoms, and chronic use of pseudoephedrine not recommended and may increase headache symptoms, recommend start 2nd gen antihistamine +/- intranasal steroid to reduce allergy/itch symptoms.     Migraine: stable       Depression:stable     PLAN:                            Increase Zepbound to 12.5 mg weekly. I also sent an prescription for 10 mg you can use for another 4 weeks if the pharmacy is out of 12.5 mg Zepbound to prevent lapse in treatment.    To help with tolerability and efficacy of Zepbound :  Eat small meals/snacks throughout the day (about every 2 hours)  Focus on getting protein in first with each meal and snack (goal to start with is 60 g protein).   Work to increase non-carbonated water - goal 64 oz throughout the day  You may try Metamucil, Benefiber, or Citrucel to help feel more full (less nausea) and have softer, more consistent bowel movements.  To optimize weight management - work on incorporating resistance training/weight lifting to build muscle and improve overall metabolism of adipose tissue.    Stop daily pseudoephedrine use (may increase risk of headache and hypertension long term). Instead, start cetirizine (Zyrtec) or loratadine (Claritin) once daily to help with allergies and itch. If eye, nose, ear symptoms are worse, you may also start Flonase nasal spray twice daily for severe allergy season - this is great as it gets right to the site of action, but may take a week to optimize, so using WITH Zyrtec or Claritin is recommended.      Follow-up: 3 months with Sandhya Stein PA-C (schedule now as she is booking well into the future). About 6 months or as needed with Abbey Palomares [facility]     SUBJECTIVE/OBJECTIVE:                          Marisel Burgos is a 48 year old female contacted via secure video for an initial visit. She was referred to me from Sandhya Stein PA-C .      Reason for visit: Medication Therapy Management - GLP1/GIP Management.    Allergies/ADRs: Reviewed in chart  Past Medical History: Reviewed in chart  Tobacco: She reports that she quit smoking about 13 years ago. Her smoking use included cigarettes. She started smoking about 30 years ago. She has quit using smokeless tobacco.  Alcohol: not currently using  Caffeine: 1-3 cups daily    Medication Adherence/Access: Medication barriers: obtaining medication from pharmacy.     Obesity   Overweight (BMI 27-29.9):   Zepbound 10 mg once weekly    Seen by Sandhya Stein PA-C 3/27/24 for Return weight management Visit    Patient reports no current medication side effects. Finds significant reduction in food noise and appetite with Zepbound. Somewhat frustrated that weight is at plateau seemingly - increase in Zepbound to 10mg not reducing weight. Would like to increase if possible. Wonders if has some insulnin resistance as has always had small portions, but difficult to lose weight.    Nutrition/Eating Habits: appetite reduced, does not feel hungry at breakfast, so often skips. Does not prioritize protein (currently about 1 meal per day). Drinks a lot of carbonated water, but not still water.      Exercise/Activity: working to increase thist - busy lifestyle.     Medication History:  No hx of pancreatitis. No hx of MEN or MTC (per Sandhya Stein PA-C note 4/4/22)    CURRENT WEIGHT:   160 lbs 0 oz     Highest wt in life: 185 lbs  Initial Weight (lbs): 180 lbs  Last Visits Weight: 74.8 kg (155 lb)  Cumulative weight loss (lbs): -25  Weight Loss Percentage: -13.5%        Wt Readings  "from Last 4 Encounters:   05/09/24 72.6 kg (160 lb)   03/27/24 70.3 kg (155 lb)   01/18/24 71.7 kg (158 lb)   12/27/23 74.8 kg (165 lb)     Estimated body mass index is 26.63 kg/m  as calculated from the following:    Height as of this encounter: 1.651 m (5' 5\").    Weight as of this encounter: 72.6 kg (160 lb).        Allergies:   Pseudoephedrine 30 mg - 1 tab every 6 hours as needed for congestion  Tylenol PM - 1 tab at bedtime as needed for allergy/itch    Patient had been taking hydroxyzine every night at bedtime for allergy and itch. Has not refilled as PCP has left [place] and hasn't re-established with new PCP. Found this better for itch/allergies and now using current regimen to manage symptoms - works ok, but not as well as hydroxyzine in the past.    Migraine:  Propranolol ER 60 mg once daily  Naratriptan 2.5 mg tab at onset of headache     Following with Neurology. Propranolol significantly improved migraine. Still occasionally has headache, naratriptan works ok. Denies side effects.    Depression:  Fluoxetine 40 mg once daily    Works well. Denies side effects.    Today's Vitals: Ht 1.651 m (5' 5\")   Wt 72.6 kg (160 lb)   Breastfeeding No   BMI 26.63 kg/m    ----------------      I spent 16 minutes with this patient today. All changes were made via collaborative practice agreement with Sandhya Stein. A copy of the visit note was provided to the patient's provider(s).    A summary of these recommendations was sent via [employer].    Abbey Palomares, Pharm D., MPH    Medication Therapy Management Pharmacist   [facility]      Telemedicine Visit Details  Type of service:  Video Conference via [employer]  Start Time:  8:10 AM  End Time:  8:26 AM     Medication Therapy Recommendations  Overweight (BMI 25.0-29.9)    Current Medication: tirzepatide-Weight Management (ZEPBOUND) 10 MG/0.5ML prefilled pen (Discontinued)   Rationale: Dose too low - Dosage too low - " Effectiveness   Recommendation: Increase Dose   Status: Accepted per CPA         Seasonal allergic rhinitis, unspecified trigger    Current Medication: pseudoePHEDrine (SUDAFED) 30 MG tablet   Rationale: Treating avoidable adverse medication reaction - Unnecessary medication therapy - Indication   Recommendation: Change Medication   Status: Patient Agreed - Adherence/Education

## 2024-07-13 ENCOUNTER — HEALTH MAINTENANCE LETTER (OUTPATIENT)
Age: 49
End: 2024-07-13

## 2024-09-19 ENCOUNTER — OFFICE VISIT (OUTPATIENT)
Dept: NEUROLOGY | Facility: CLINIC | Age: 49
End: 2024-09-19
Payer: COMMERCIAL

## 2024-09-19 VITALS
RESPIRATION RATE: 16 BRPM | SYSTOLIC BLOOD PRESSURE: 106 MMHG | HEART RATE: 83 BPM | DIASTOLIC BLOOD PRESSURE: 73 MMHG | OXYGEN SATURATION: 98 %

## 2024-09-19 DIAGNOSIS — G43.709 CHRONIC MIGRAINE WITHOUT AURA WITHOUT STATUS MIGRAINOSUS, NOT INTRACTABLE: Primary | ICD-10-CM

## 2024-09-19 DIAGNOSIS — G43.101 MIGRAINE WITH AURA AND WITH STATUS MIGRAINOSUS, NOT INTRACTABLE: ICD-10-CM

## 2024-09-19 DIAGNOSIS — R06.02 SHORTNESS OF BREATH: ICD-10-CM

## 2024-09-19 PROCEDURE — 99214 OFFICE O/P EST MOD 30 MIN: CPT | Performed by: NURSE PRACTITIONER

## 2024-09-19 RX ORDER — PROPRANOLOL HYDROCHLORIDE 80 MG/1
80 CAPSULE, EXTENDED RELEASE ORAL DAILY
Qty: 90 CAPSULE | Refills: 4 | Status: SHIPPED | OUTPATIENT
Start: 2024-09-19

## 2024-09-19 RX ORDER — RIZATRIPTAN BENZOATE 5 MG/1
5-10 TABLET, ORALLY DISINTEGRATING ORAL
Qty: 18 TABLET | Refills: 6 | Status: SHIPPED | OUTPATIENT
Start: 2024-09-19

## 2024-09-19 RX ORDER — ONDANSETRON 4 MG/1
4 TABLET, ORALLY DISINTEGRATING ORAL EVERY 8 HOURS PRN
Qty: 20 TABLET | Refills: 3 | Status: SHIPPED | OUTPATIENT
Start: 2024-09-19

## 2024-09-19 ASSESSMENT — PAIN SCALES - GENERAL: PAINLEVEL: NO PAIN (0)

## 2024-09-19 NOTE — PATIENT INSTRUCTIONS
Acute Treatment:  -For acute treatment of mild headache, take acetaminophen or ibuprofen  as needed. Do not exceed more than 14 days per month to avoid medication overuse.  -For acute treatment of moderate to severe headache, take nurtec at the onset of headache, with a repeat dose in two hours if needed. May cause nausea   Rizatriptan as back up  -For headache related nausea, or as a rescue medicine for headache, take ondansetron as needed.     Preventive Treatment:  -For headache prevention, a trial of propranolol 80 mg daily   EKG for mild shortness of breath    Follow up in 3-4 months or sooner if needed

## 2024-09-19 NOTE — LETTER
9/19/2024       RE: Marisel Burgos  [address] [place] [address]     Dear Colleague,    Thank you for referring your patient, Marisel Burgos, to the [facility] [place] [facility]. Please see a copy of my visit note below.    [facility]    Headache Neurology Progress Note  September 19, 2024      Assessment/Plan:   Marisel Burgos is a 49 year old   Chronic migraine   Acute Treatment:  -For acute treatment of mild headache, take acetaminophen or ibuprofen  as needed. Do not exceed more than 14 days per month to avoid medication overuse.  -For acute treatment of moderate to severe headache, take nurtec at the onset of headache, with a repeat dose in two hours if needed. May cause nausea   Rizatriptan as back up  -For headache related nausea, or as a rescue medicine for headache, take ondansetron as needed.     Preventive Treatment:  -For headache prevention, a trial of propranolol 80 mg daily   EKG for mild shortness of breath  Alternatevely we could try injectable CGRPs or nurtec every other day could be an option.     Follow up in 3-4 months or sooner if needed     All of patient's questions were answered from the best of my knowledge.  Patient is in agreement with the plan.     30 minutes spent on the date of the encounter doing video access, chart  review,  results review,  meds review, treatment plan, documentation and further activities as noted above    MATTI Hudson, CNP [facility]        Subjective:    Marisel Burgos returns for follow up of headaches   Vertigo a couple times per week onset the beginning of June 2024. Can get nauseous with vertigo but does not vomit. Duration usually 30 min -1 hour. Moving around helps.   Headaches low underlined for 2-3 days in conjunction with vertigo. Reports that with propranolol headaches are better. Still has to lay down  but pain less intense and no vomiting.   Stress -work -got a promotion but changes in job description and uncertainty around.   Patient reports acid reflux but needs to stay upright than symptoms improve.   Total at least 2-3/week and duration about 2-3 days but they are mild and not like used to be.   Shortness of breath and sweating with exercising -possible med related. Playing tennis. No history of asthma   Takes tylenol PM -wakes up in the middle of the night, on HRT    Headache treatment   Sumatriptan, rizatriptan, naratriptan     Objective:    Vitals: /73   Pulse 83   Resp 16   SpO2 98%   General: Cooperative, NAD  Neurologic:  Mental Status: Fully alert, attentive and oriented. Speech clear and fluent.   Cranial Nerves: Facial movements symmetric.   Motor: No abnormal movements.      Pertinent Investigations:          1/10/2022     7:43 AM 4/6/2022    11:17 AM 1/18/2024     8:23 AM   HIT-6   When you have headaches, how often is the pain severe 13 10 10   How often do headaches limit your ability to do usual daily activities including household work, work, school, or social activities? 11 8 10   When you have a headache, how often do you wish you could lie down? 13 11 13   In the past 4 weeks, how often have you felt too tired to do work or daily activities because of your headaches 11 6 10   In the past 4 weeks, how often have you felt fed up or irritated because of your headaches 11 6 10   In the past 4 weeks, how often did headaches limit your ability to concentrate on work or daily activities 11 6 13   HIT-6 Total Score 70 47 66           1/18/2024     8:22 AM   MIDAS - in the past three months:   On how many days did you miss work or school because of your headaches? 5   How many days was your productivity at work or school reduced by half or more because of your headaches? 0   On how many days did you not do household work because of your headaches? 6   How many days was your productivity in  household work reduced by half or more because of your headaches? 0   On how many days did you miss family, social, or leisure activities because of your headaches? 0   On how many days did you have a headache? 6   On a scale of 0-10, on average how painful were these headaches? 6   MIDAS Score 11 (III - Moderate Disability)          Again, thank you for allowing me to participate in the care of your patient.      Sincerely,    MATTI Gavin CNP

## 2024-09-19 NOTE — PROGRESS NOTES
[facility]    Headache Neurology Progress Note  September 19, 2024      Assessment/Plan:   Marisel Burgos is a 49 year old   Chronic migraine   Acute Treatment:  -For acute treatment of mild headache, take acetaminophen or ibuprofen  as needed. Do not exceed more than 14 days per month to avoid medication overuse.  -For acute treatment of moderate to severe headache, take nurtec at the onset of headache, with a repeat dose in two hours if needed. May cause nausea   Rizatriptan as back up  -For headache related nausea, or as a rescue medicine for headache, take ondansetron as needed.     Preventive Treatment:  -For headache prevention, a trial of propranolol 80 mg daily   EKG for mild shortness of breath  Alternatevely we could try injectable CGRPs or nurtec every other day could be an option.     Follow up in 3-4 months or sooner if needed     All of patient's questions were answered from the best of my knowledge.  Patient is in agreement with the plan.     30 minutes spent on the date of the encounter doing video access, chart  review,  results review,  meds review, treatment plan, documentation and further activities as noted above    MATTI Hudson, CNP [facility]        Subjective:    Marisel Burgos returns for follow up of headaches   Vertigo a couple times per week onset the beginning of June 2024. Can get nauseous with vertigo but does not vomit. Duration usually 30 min -1 hour. Moving around helps.   Headaches low underlined for 2-3 days in conjunction with vertigo. Reports that with propranolol headaches are better. Still has to lay down but pain less intense and no vomiting.   Stress -work -got a promotion but changes in job description and uncertainty around.   Patient reports acid reflux but needs to stay upright than symptoms improve.   Total at least 2-3/week and duration about 2-3 days but they are mild and not like used to be.   Shortness of  breath and sweating with exercising -possible med related. Playing tennis. No history of asthma   Takes tylenol PM -wakes up in the middle of the night, on HRT    Headache treatment   Sumatriptan, rizatriptan, naratriptan     Objective:    Vitals: /73   Pulse 83   Resp 16   SpO2 98%   General: Cooperative, NAD  Neurologic:  Mental Status: Fully alert, attentive and oriented. Speech clear and fluent.   Cranial Nerves: Facial movements symmetric.   Motor: No abnormal movements.      Pertinent Investigations:          1/10/2022     7:43 AM 4/6/2022    11:17 AM 1/18/2024     8:23 AM   HIT-6   When you have headaches, how often is the pain severe 13 10 10   How often do headaches limit your ability to do usual daily activities including household work, work, school, or social activities? 11 8 10   When you have a headache, how often do you wish you could lie down? 13 11 13   In the past 4 weeks, how often have you felt too tired to do work or daily activities because of your headaches 11 6 10   In the past 4 weeks, how often have you felt fed up or irritated because of your headaches 11 6 10   In the past 4 weeks, how often did headaches limit your ability to concentrate on work or daily activities 11 6 13   HIT-6 Total Score 70 47 66           1/18/2024     8:22 AM   MIDAS - in the past three months:   On how many days did you miss work or school because of your headaches? 5   How many days was your productivity at work or school reduced by half or more because of your headaches? 0   On how many days did you not do household work because of your headaches? 6   How many days was your productivity in household work reduced by half or more because of your headaches? 0   On how many days did you miss family, social, or leisure activities because of your headaches? 0   On how many days did you have a headache? 6   On a scale of 0-10, on average how painful were these headaches? 6   MIDAS Score 11 (III - Moderate  Disability)         <<----- Click to add NO significant Past Surgical History

## 2024-09-25 DIAGNOSIS — E66.3 OVERWEIGHT (BMI 25.0-29.9): ICD-10-CM

## 2024-10-01 NOTE — TELEPHONE ENCOUNTER
Medication Requested:  tirzepatide-Weight Management (ZEPBOUND) 12.5 MG/0.5ML prefilled pen 2 mL 2 5/9/2024 -- No   Sig - Route: Inject 0.5 mLs (12.5 mg) Subcutaneous every 7 days - Subcutaneous     ----------------------  Last Office Visit : 3/27/2024  [facility]      Future Office visit:  Selected Appointment   10/9/2024 8:30 AM (30 min)  [facility]   Arrive by:  8:15 AM   RETURN WEIGHT MANAGEMENT    [facility])   Sandhya Stein PA-C     ----------------------    Refill decision: Refill pended and routed to the provider for review/determination due to the following criteria not met:     Tirzepatide -Weight Management (ZEPBOUND)   Not on Weight Mgmt Refill Protocol

## 2024-10-02 RX ORDER — TIRZEPATIDE 12.5 MG/.5ML
INJECTION, SOLUTION SUBCUTANEOUS
Qty: 2 ML | Refills: 0 | Status: SHIPPED | OUTPATIENT
Start: 2024-10-02 | End: 2024-10-09

## 2024-10-02 NOTE — TELEPHONE ENCOUNTER
Appointment with Sandhya Stein PA-C next week. Routed to [facility] and [facility] for sign off.

## 2024-10-09 ENCOUNTER — VIRTUAL VISIT (OUTPATIENT)
Dept: ENDOCRINOLOGY | Facility: CLINIC | Age: 49
End: 2024-10-09
Payer: COMMERCIAL

## 2024-10-09 VITALS — HEIGHT: 65 IN | WEIGHT: 163 LBS | BODY MASS INDEX: 27.16 KG/M2

## 2024-10-09 DIAGNOSIS — E66.3 OVERWEIGHT (BMI 25.0-29.9): ICD-10-CM

## 2024-10-09 PROCEDURE — G2211 COMPLEX E/M VISIT ADD ON: HCPCS | Mod: 95 | Performed by: PHYSICIAN ASSISTANT

## 2024-10-09 PROCEDURE — 99213 OFFICE O/P EST LOW 20 MIN: CPT | Mod: 95 | Performed by: PHYSICIAN ASSISTANT

## 2024-10-09 ASSESSMENT — PAIN SCALES - GENERAL: PAINLEVEL: NO PAIN (0)

## 2024-10-09 NOTE — PROGRESS NOTES
Return Medical Weight Management Note     Marisel Burgos  MRN:  [id number]  :  1975  SUSAN:  10/9/2024    Dear Bebe Palmer MD,    I had the pleasure of seeing your patient Marisel Burgos. She is a 49 year old female who I am continuing to see for treatment of obesity related to:        2022    12:50 PM   --   I have the following health issues associated with obesity None of the above   I have the following symptoms associated with obesity Back Pain       Assessment & Plan   Problem List Items Addressed This Visit       Overweight (BMI 25.0-29.9)    Relevant Medications    tirzepatide-Weight Management (ZEPBOUND) 12.5 MG/0.5ML prefilled pen      Weight mgmt follow up  Has lost from 180 to 163 in total (Up 8 lbs from last visit 2024)    Taking Zepbound 12.5mg weekly, ran out 2 weeks ago and needs refill.  Skipped a dose while having covid.     Follow up plan:     MTM Abbey 2 months phone   Sandhya 4 months return MWM     INTERVAL HISTORY:    Anti-obesity medication history    Current:   Zepbound 12.5mg weekly    Recent diet changes: tries to focus on protein for most meals, drinking plenty of fluids    Recent exercise/activity changes: plays tennis, trying to add strength once weekly    Recent stressors: stress at work with job description change    Recent sleep changes: sleeps well, takes tylenol PM       CURRENT WEIGHT:   163 lbs 0 oz    Initial Weight (lbs): 180 lbs     Cumulative weight loss (lbs): 17  Weight Loss Percentage: 9.44%        10/9/2024     7:42 AM   Changes and Difficulties   I have made the following changes to my activity/exercise since my last visit: I was ff my med for two weeks and my weight did go up         MEDICATIONS:   Current Outpatient Medications   Medication Sig Dispense Refill    tirzepatide-Weight Management (ZEPBOUND) 12.5 MG/0.5ML prefilled pen Inject 0.5 mLs (12.5 mg) subcutaneously every 7 days. 2 mL 5    cetirizine (ZYRTEC) 10 MG tablet Take 1 tablet (10 mg) by  mouth daily      diphenhydrAMINE-acetaminophen (TYLENOL PM)  MG tablet Take 1 tablet by mouth nightly as needed for sleep      FLUoxetine (PROZAC) 40 MG capsule TAKE 1 CAPSULE BY MOUTH EVERY DAY 90 capsule 3    fluticasone (FLONASE) 50 MCG/ACT nasal spray Spray 1 spray into both nostrils daily      ondansetron (ZOFRAN ODT) 4 MG ODT tab Take 1 tablet (4 mg) by mouth every 8 hours as needed for nausea. 20 tablet 3    propranolol ER (INDERAL LA) 80 MG 24 hr capsule Take 1 capsule (80 mg) by mouth daily. 90 capsule 4    rimegepant (NURTEC) 75 MG ODT tablet Place 1 tablet (75 mg) under the tongue daily as needed for migraine. Maximum of 1 tablet every 24 hours. 8 tablet 11    rizatriptan (MAXALT-MLT) 5 MG ODT Take 1-2 tablets (5-10 mg) by mouth at onset of headache for migraine (may repeat in 2 hours as needed. Max 30 mg in 24 hours). 18 tablet 6           10/9/2024     7:42 AM   Weight Loss Medication History Reviewed With Patient   If you are not taking a weight loss medication that was prescribed to you, please indicate why: Other   Are you having any side effects from the weight loss medication that we have prescribed you? No       Ambulatory - [facility] on 01/22/2021   Component Date Value Ref Range Status    SARS-CoV-2 Virus Specimen Source 01/22/2021 Nasopharyngeal   Final    SARS-CoV-2 PCR Result 01/22/2021 NEGATIVE   Final    SARS-CoV2 (COVID-19) RNA not detected, presumed negative.    SARS-CoV-2 PCR Comment 01/22/2021 Testing was performed using the Aptima SARS-CoV-2 Assay on the [employer]   Final    Comment: Instrument System. Additional information about this Emergency Use  Authorization (EUA) assay can be found via the Lab Guide.  This test should be ordered for the detection of SARS-CoV-2 in individuals who  meet SARS-CoV-2 clinical and/or epidemiological criteria. Test performance is  unknown in asymptomatic patients.  This test is for in vitro diagnostic use under the FDA EUA for  "laboratories  certified under CLIA to perform high complexity testing. This test has not been  FDA cleared or approved.  A negative result does not rule out the presence of PCR inhibitors in the  specimen or target RNA in concentration below the limit of detection for the  assay. The possibility of a false negative should be considered if the  patient's recent exposure or clinical presentation suggests COVID-19.  This test was validated by the [facility]  [facility]. This laboratory is certified under the Clinical Laboratory  Improvement Amendments of 1988 (CLIA-88) as qu                           alified to perform high  complexity laboratory testing.    Performed and/or entered by:  [facility]  [address] [place] [address]       PHYSICAL EXAM:  Objective    Ht 1.651 m (5' 5\")   Wt 73.9 kg (163 lb)   BMI 27.12 kg/m      Vitals - Patient Reported  Pain Score: No Pain (0)        GENERAL: alert and no distress  EYES: Eyes grossly normal to inspection.  No discharge or erythema, or obvious scleral/conjunctival abnormalities.  RESP: No audible wheeze, cough, or visible cyanosis.    SKIN: Visible skin clear. No significant rash, abnormal pigmentation or lesions.  NEURO: Cranial nerves grossly intact.  Mentation and speech appropriate for age.  PSYCH: Appropriate affect, tone, and pace of words        Sincerely,    Sandhya Stein PA-C      20 minutes spent by me on the date of the encounter doing chart review, history and exam, documentation and further activities per the note    Virtual Visit Details    Type of service:  Video Visit     Originating Location (pt. Location): Home    Distant Location (provider location):  Off-site  Platform used for Video Visit: Paula    The longitudinal plan of care for the diagnosis(es)/condition(s) as documented were addressed during this visit. Due to the added complexity in care, I will " continue to support Marisel in the subsequent management and with ongoing continuity of care.

## 2024-10-09 NOTE — LETTER
10/9/2024       RE: Marisel Burgos  [address] [place] [address]     Dear Colleague,    Thank you for referring your patient, Marisel Burgos, to the [facility] [place] at [facility]. Please see a copy of my visit note below.      Return Medical Weight Management Note     Marisel Burgos  MRN:  [id number]  :  1975  SUSAN:  10/9/2024    Dear Bebe Palmer MD,    I had the pleasure of seeing your patient Marisel Burgos. She is a 49 year old female who I am continuing to see for treatment of obesity related to:        2022    12:50 PM   --   I have the following health issues associated with obesity None of the above   I have the following symptoms associated with obesity Back Pain       Assessment & Plan  Problem List Items Addressed This Visit       Overweight (BMI 25.0-29.9)    Relevant Medications    tirzepatide-Weight Management (ZEPBOUND) 12.5 MG/0.5ML prefilled pen      Weight mgmt follow up  Has lost from 180 to 163 in total (Up 8 lbs from last visit 2024)    Taking Zepbound 12.5mg weekly, ran out 2 weeks ago and needs refill.  Skipped a dose while having covid.     Follow up plan:     MICHAEL Potter 2 months phone   Sandhya 4 months return MWM     INTERVAL HISTORY:    Anti-obesity medication history    Current:   Zepbound 12.5mg weekly    Recent diet changes: tries to focus on protein for most meals, drinking plenty of fluids    Recent exercise/activity changes: plays tennis, trying to add strength once weekly    Recent stressors: stress at work with job description change    Recent sleep changes: sleeps well, takes tylenol PM       CURRENT WEIGHT:   163 lbs 0 oz    Initial Weight (lbs): 180 lbs     Cumulative weight loss (lbs): 17  Weight Loss Percentage: 9.44%        10/9/2024     7:42 AM   Changes and Difficulties   I have made the following changes to my activity/exercise since my last visit: I was ff my med  for two weeks and my weight did go up         MEDICATIONS:   Current Outpatient Medications   Medication Sig Dispense Refill     tirzepatide-Weight Management (ZEPBOUND) 12.5 MG/0.5ML prefilled pen Inject 0.5 mLs (12.5 mg) subcutaneously every 7 days. 2 mL 5     cetirizine (ZYRTEC) 10 MG tablet Take 1 tablet (10 mg) by mouth daily       diphenhydrAMINE-acetaminophen (TYLENOL PM)  MG tablet Take 1 tablet by mouth nightly as needed for sleep       FLUoxetine (PROZAC) 40 MG capsule TAKE 1 CAPSULE BY MOUTH EVERY DAY 90 capsule 3     fluticasone (FLONASE) 50 MCG/ACT nasal spray Spray 1 spray into both nostrils daily       ondansetron (ZOFRAN ODT) 4 MG ODT tab Take 1 tablet (4 mg) by mouth every 8 hours as needed for nausea. 20 tablet 3     propranolol ER (INDERAL LA) 80 MG 24 hr capsule Take 1 capsule (80 mg) by mouth daily. 90 capsule 4     rimegepant (NURTEC) 75 MG ODT tablet Place 1 tablet (75 mg) under the tongue daily as needed for migraine. Maximum of 1 tablet every 24 hours. 8 tablet 11     rizatriptan (MAXALT-MLT) 5 MG ODT Take 1-2 tablets (5-10 mg) by mouth at onset of headache for migraine (may repeat in 2 hours as needed. Max 30 mg in 24 hours). 18 tablet 6           10/9/2024     7:42 AM   Weight Loss Medication History Reviewed With Patient   If you are not taking a weight loss medication that was prescribed to you, please indicate why: Other   Are you having any side effects from the weight loss medication that we have prescribed you? No       Ambulatory - [facility] on 01/22/2021   Component Date Value Ref Range Status     SARS-CoV-2 Virus Specimen Source 01/22/2021 Nasopharyngeal   Final     SARS-CoV-2 PCR Result 01/22/2021 NEGATIVE   Final    SARS-CoV2 (COVID-19) RNA not detected, presumed negative.     SARS-CoV-2 PCR Comment 01/22/2021 Testing was performed using the Aptima SARS-CoV-2 Assay on the [employer]   Final    Comment: Instrument System. Additional information about this Emergency  "Use  Authorization (EUA) assay can be found via the Lab Guide.  This test should be ordered for the detection of SARS-CoV-2 in individuals who  meet SARS-CoV-2 clinical and/or epidemiological criteria. Test performance is  unknown in asymptomatic patients.  This test is for in vitro diagnostic use under the FDA EUA for laboratories  certified under CLIA to perform high complexity testing. This test has not been  FDA cleared or approved.  A negative result does not rule out the presence of PCR inhibitors in the  specimen or target RNA in concentration below the limit of detection for the  assay. The possibility of a false negative should be considered if the  patient's recent exposure or clinical presentation suggests COVID-19.  This test was validated by the [facility]  [facility]. This laboratory is certified under the Clinical Laboratory  Improvement Amendments of 1988 (CLIA-88) as qu                           alified to perform high  complexity laboratory testing.    Performed and/or entered by:  [facility]  [address] [place] [address]       PHYSICAL EXAM:  Objective   Ht 1.651 m (5' 5\")   Wt 73.9 kg (163 lb)   BMI 27.12 kg/m      Vitals - Patient Reported  Pain Score: No Pain (0)        GENERAL: alert and no distress  EYES: Eyes grossly normal to inspection.  No discharge or erythema, or obvious scleral/conjunctival abnormalities.  RESP: No audible wheeze, cough, or visible cyanosis.    SKIN: Visible skin clear. No significant rash, abnormal pigmentation or lesions.  NEURO: Cranial nerves grossly intact.  Mentation and speech appropriate for age.  PSYCH: Appropriate affect, tone, and pace of words        Sincerely,    Sandhya Stein PA-C      20 minutes spent by me on the date of the encounter doing chart review, history and exam, documentation and further activities per the note    Virtual Visit Details    Type of " service:  Video Visit     Originating Location (pt. Location): Home    Distant Location (provider location):  Off-site  Platform used for Video Visit: Paula    The longitudinal plan of care for the diagnosis(es)/condition(s) as documented were addressed during this visit. Due to the added complexity in care, I will continue to support Marisel in the subsequent management and with ongoing continuity of care.      Again, thank you for allowing me to participate in the care of your patient.      Sincerely,    Sandhya Stein PA-C     IV discontinued, cath removed intact

## 2024-10-09 NOTE — NURSING NOTE
Current patient location: home    Is the patient currently in the state of [place]? YES    Visit mode:VIDEO    If the visit is dropped, the patient can be reconnected by: VIDEO VISIT: Text to cell phone:   Telephone Information:   Mobile [phone]       Will anyone else be joining the visit? NO  (If patient encounters technical issues they should call [phone]    Are changes needed to the allergy or medication list? Pt stated no changes to allergies and Pt stated no med changes    Are refills needed on medications prescribed by this physician? YES    Rooming Documentation:  Questionnaire(s) completed      Reason for visit: RECHECK    Wt other than 24 hrs:    Pain more than one location:  no  Vannessa BROWN

## 2024-11-15 ENCOUNTER — TELEPHONE (OUTPATIENT)
Dept: ENDOCRINOLOGY | Facility: CLINIC | Age: 49
End: 2024-11-15
Payer: COMMERCIAL

## 2024-11-15 NOTE — TELEPHONE ENCOUNTER
Left Voicemail (1st Attempt) and Sent Mychart (1st Attempt) for the patient to call back and schedule the following:    Appointment type: Return MWM  Provider: Sandhya Stein  Return date: 4 mo (around Feb 2025)  Specialty phone number: [phone]  Additional appointment(s) needed: Return MTM visit with Abbey Palomares in 2 mo (around Dec 2024)  Additonal Notes: per 10/9/24 checkout orders  LVM/MyC x1

## 2025-01-20 ENCOUNTER — TELEPHONE (OUTPATIENT)
Dept: ENDOCRINOLOGY | Facility: CLINIC | Age: 50
End: 2025-01-20
Payer: COMMERCIAL

## 2025-01-20 NOTE — TELEPHONE ENCOUNTER
PA Initiation    Medication: ZEPBOUND 12.5 MG/0.5ML SC SOAJ  Insurance Company: [employer] [place] - Phone [phone] Fax [phone]  Pharmacy Filling the Rx:    Filling Pharmacy Phone:    Filling Pharmacy Fax:    Start Date: 1/20/2025    [id number]

## 2025-02-25 DIAGNOSIS — E66.3 OVERWEIGHT (BMI 25.0-29.9): ICD-10-CM

## 2025-03-03 ENCOUNTER — TELEPHONE (OUTPATIENT)
Dept: ENDOCRINOLOGY | Facility: CLINIC | Age: 50
End: 2025-03-03
Payer: COMMERCIAL

## 2025-03-03 RX ORDER — TIRZEPATIDE 12.5 MG/.5ML
INJECTION, SOLUTION SUBCUTANEOUS
Qty: 2 ML | Refills: 2 | OUTPATIENT
Start: 2025-03-03

## 2025-03-03 NOTE — TELEPHONE ENCOUNTER
Last Written Prescription:  tirzepatide-Weight Management (ZEPBOUND) 12.5 MG/0.5ML prefilled pen 2 mL 5 10/9/2024     ----------------------  Last Visit Date: 10/9/24  Future Visit Date: none  Received refill request for Zepbound . Patient needs appointment scheduled prior to any refills. Clinic Coordinator notified and will follow up with the patient as appropriate. The pharmacy has been notified that the medication will not be refilled prior to an appointment being scheduled.      Addis Martinez RN  [facility] Central Nursing/Red Flag Triage & Med Refill Team             Request from pharmacy:  Requested Prescriptions   Pending Prescriptions Disp Refills    ZEPBOUND 12.5 MG/0.5ML prefilled pen [Pharmacy Med Name: ZEPBOUND 12.5MG/0.5ML SOAJ] 2 mL 2     Sig: INJECT THE CONTENTS OF 1 AUTOINJECTOR PEN UNDER THE SKIN ONCE WEEKLY       There is no refill protocol information for this order         Pt arrived in radiology triage for Lumbar Myelogram. [facility].

## 2025-03-03 NOTE — TELEPHONE ENCOUNTER
Sent myc msg 3/3    Zepbound denied, has no future appointment   Please contact patient to schedule appt   FYI to nurses

## 2025-06-07 ENCOUNTER — HEALTH MAINTENANCE LETTER (OUTPATIENT)
Age: 50
End: 2025-06-07

## 2025-07-19 ENCOUNTER — HEALTH MAINTENANCE LETTER (OUTPATIENT)
Age: 50
End: 2025-07-19

## 2025-07-30 ASSESSMENT — HEADACHE IMPACT TEST (HIT 6)
HOW OFTEN DID HEADACHS LIMIT CONCENTRATION ON WORK OR DAILY ACTIVITY: VERY OFTEN
HOW OFTEN HAVE YOU FELT FED UP OR IRRITATED BECAUSE OF YOUR HEADACHES: VERY OFTEN
HIT6 TOTAL SCORE: 66
HOW OFTEN HAVE YOU FELT TOO TIRED TO WORK BECAUSE OF YOUR HEADACHES: VERY OFTEN
WHEN YOU HAVE HEADACHES HOW OFTEN IS THE PAIN SEVERE: VERY OFTEN
WHEN YOU HAVE A HEADACHE HOW OFTEN DO YOU WISH YOU COULD LIE DOWN: VERY OFTEN
HOW OFTEN DO HEADACHES LIMIT YOUR DAILY ACTIVITIES: VERY OFTEN

## 2025-07-30 ASSESSMENT — MIGRAINE DISABILITY ASSESSMENT (MIDAS)
HOW OFTEN WERE SOCIAL ACTIVITIES MISSED DUE TO HEADACHES: 15
TOTAL SCORE: 103
HOW MANY DAYS WAS HOUSEWORK PRODUCTIVITY CUT IN HALF DUE TO HEADACHES: 20
HOW MANY DAYS DID YOU MISS WORK OR SCHOOL BECAUSE OF HEADACHES: 24
HOW MANY DAYS IN THE PAST 3 MONTHS HAVE YOU HAD A HEADACHE: 24
HOW MANY DAYS WAS YOUR PRODUCTIVITY CUT IN HALF BECAUSE OF HEADACHES: 24
ON A SCALE FROM 0-10 ON AVERAGE HOW PAINFUL WERE HEADACHES: 7
HOW MANY DAYS DID YOU NOT DO HOUSEWORK BECAUSE OF HEADACHES: 20

## 2025-08-01 ENCOUNTER — VIRTUAL VISIT (OUTPATIENT)
Dept: NEUROLOGY | Facility: CLINIC | Age: 50
End: 2025-08-01
Payer: COMMERCIAL

## 2025-08-01 DIAGNOSIS — G43.709 CHRONIC MIGRAINE WITHOUT AURA WITHOUT STATUS MIGRAINOSUS, NOT INTRACTABLE: Primary | ICD-10-CM

## 2025-08-01 DIAGNOSIS — G43.101 MIGRAINE WITH AURA AND WITH STATUS MIGRAINOSUS, NOT INTRACTABLE: ICD-10-CM

## 2025-08-01 PROCEDURE — 1125F AMNT PAIN NOTED PAIN PRSNT: CPT | Mod: 95 | Performed by: NURSE PRACTITIONER

## 2025-08-01 PROCEDURE — 98005 SYNCH AUDIO-VIDEO EST LOW 20: CPT | Performed by: NURSE PRACTITIONER

## 2025-08-01 RX ORDER — PROPRANOLOL HYDROCHLORIDE 80 MG/1
80 TABLET ORAL AT BEDTIME
Qty: 90 TABLET | Refills: 4 | Status: SHIPPED | OUTPATIENT
Start: 2025-08-01